# Patient Record
Sex: FEMALE | Race: BLACK OR AFRICAN AMERICAN | Employment: UNEMPLOYED | URBAN - METROPOLITAN AREA
[De-identification: names, ages, dates, MRNs, and addresses within clinical notes are randomized per-mention and may not be internally consistent; named-entity substitution may affect disease eponyms.]

---

## 2017-04-17 ENCOUNTER — HOSPITAL ENCOUNTER (EMERGENCY)
Age: 32
Discharge: HOME | End: 2017-04-17
Attending: EMERGENCY MEDICINE
Payer: MEDICAID

## 2017-04-17 VITALS
DIASTOLIC BLOOD PRESSURE: 85 MMHG | WEIGHT: 230 LBS | TEMPERATURE: 98.2 F | BODY MASS INDEX: 32.2 KG/M2 | HEIGHT: 71 IN | SYSTOLIC BLOOD PRESSURE: 120 MMHG | RESPIRATION RATE: 18 BRPM | OXYGEN SATURATION: 98 % | HEART RATE: 77 BPM

## 2017-04-17 DIAGNOSIS — N73.0 PID (ACUTE PELVIC INFLAMMATORY DISEASE): ICD-10-CM

## 2017-04-17 DIAGNOSIS — N76.0 BV (BACTERIAL VAGINOSIS): Primary | ICD-10-CM

## 2017-04-17 DIAGNOSIS — B96.89 BV (BACTERIAL VAGINOSIS): Primary | ICD-10-CM

## 2017-04-17 LAB
APPEARANCE UR: ABNORMAL
BACTERIA URNS QL MICRO: ABNORMAL /HPF
BILIRUB UR QL: NEGATIVE
CLUE CELLS VAG QL WET PREP: NORMAL
COLOR UR: ABNORMAL
EPITH CASTS URNS QL MICRO: ABNORMAL /LPF
GLUCOSE UR STRIP.AUTO-MCNC: NEGATIVE MG/DL
HCG UR QL: NEGATIVE
HGB UR QL STRIP: NEGATIVE
HYALINE CASTS URNS QL MICRO: ABNORMAL /LPF (ref 0–5)
KETONES UR QL STRIP.AUTO: NEGATIVE MG/DL
KOH PREP SPEC: NORMAL
LEUKOCYTE ESTERASE UR QL STRIP.AUTO: NEGATIVE
NITRITE UR QL STRIP.AUTO: NEGATIVE
PH UR STRIP: 6 [PH] (ref 5–8)
PROT UR STRIP-MCNC: NEGATIVE MG/DL
RBC #/AREA URNS HPF: ABNORMAL /HPF (ref 0–5)
SERVICE CMNT-IMP: NORMAL
SP GR UR REFRACTOMETRY: 1.02 (ref 1–1.03)
T VAGINALIS VAG QL WET PREP: NORMAL
UROBILINOGEN UR QL STRIP.AUTO: 0.2 EU/DL (ref 0.2–1)
WBC URNS QL MICRO: ABNORMAL /HPF (ref 0–4)

## 2017-04-17 PROCEDURE — 96372 THER/PROPH/DIAG INJ SC/IM: CPT

## 2017-04-17 PROCEDURE — 87210 SMEAR WET MOUNT SALINE/INK: CPT | Performed by: EMERGENCY MEDICINE

## 2017-04-17 PROCEDURE — 99284 EMERGENCY DEPT VISIT MOD MDM: CPT

## 2017-04-17 PROCEDURE — 81001 URINALYSIS AUTO W/SCOPE: CPT | Performed by: EMERGENCY MEDICINE

## 2017-04-17 PROCEDURE — 74011250636 HC RX REV CODE- 250/636: Performed by: EMERGENCY MEDICINE

## 2017-04-17 PROCEDURE — 87491 CHLMYD TRACH DNA AMP PROBE: CPT | Performed by: EMERGENCY MEDICINE

## 2017-04-17 PROCEDURE — 81025 URINE PREGNANCY TEST: CPT

## 2017-04-17 PROCEDURE — 74011000250 HC RX REV CODE- 250: Performed by: EMERGENCY MEDICINE

## 2017-04-17 RX ORDER — METRONIDAZOLE 500 MG/1
500 TABLET ORAL 2 TIMES DAILY
Qty: 14 TAB | Refills: 0 | Status: SHIPPED | OUTPATIENT
Start: 2017-04-17 | End: 2017-04-17

## 2017-04-17 RX ORDER — DOXYCYCLINE HYCLATE 100 MG
100 TABLET ORAL 2 TIMES DAILY
Qty: 20 TAB | Refills: 0 | Status: SHIPPED | OUTPATIENT
Start: 2017-04-17 | End: 2017-04-27

## 2017-04-17 RX ORDER — DOXYCYCLINE HYCLATE 100 MG
100 TABLET ORAL 2 TIMES DAILY
Qty: 20 TAB | Refills: 0 | Status: SHIPPED | OUTPATIENT
Start: 2017-04-17 | End: 2017-04-17

## 2017-04-17 RX ORDER — METRONIDAZOLE 500 MG/1
500 TABLET ORAL 2 TIMES DAILY
Qty: 14 TAB | Refills: 0 | Status: SHIPPED | OUTPATIENT
Start: 2017-04-17 | End: 2017-04-24

## 2017-04-17 RX ADMIN — LIDOCAINE HYDROCHLORIDE 250 MG: 10 INJECTION, SOLUTION EPIDURAL; INFILTRATION; INTRACAUDAL; PERINEURAL at 12:35

## 2017-04-17 NOTE — ED PROVIDER NOTES
HPI Comments: 28 y.o. female with past medical history significant for depression, gonorrhea and chlamydia who presents from home with chief complaint of abdominal pain. Patient arrives today complaining of 3-4 days of intermittent mid-lower abdominal pain. She reports dysuria and white vaginal discharge. She denies nausea, vomiting, diarrhea, hematuria, urinary frequency or urinary urgency. Patient reports having similar symptoms in the past when she had a yeast infection during pregnancy. She is currently on birth control pills. She states that her LMP was 1 week ago (ended 4-5 days ago) and was normal. There are no other acute medical concerns at this time. Social hx: Nonsmoker, occasional alcohol use    PCP: Manuel Chapin MD    Note written by adria Diane, as dictated by Rosangela Lyons MD 10:07 AM      The history is provided by the patient. Past Medical History:   Diagnosis Date    Chlamydia 2008    Dental caries 8/10/2012    Depression     Gonorrhea 2008    Lipoma of back        History reviewed. No pertinent surgical history. Family History:   Problem Relation Age of Onset    Cancer Mother     Heart Disease Father        Social History     Social History    Marital status: SINGLE     Spouse name: N/A    Number of children: N/A    Years of education: N/A     Occupational History    Not on file. Social History Main Topics    Smoking status: Never Smoker    Smokeless tobacco: Never Used    Alcohol use No      Comment: occasional    Drug use: No    Sexual activity: Yes     Partners: Male     Birth control/ protection: None     Other Topics Concern    Not on file     Social History Narrative         ALLERGIES: Review of patient's allergies indicates no known allergies. Review of Systems   Constitutional: Negative for appetite change, chills and fever. HENT: Negative for rhinorrhea, sore throat and trouble swallowing. Eyes: Negative for photophobia.    Respiratory: Negative for cough and shortness of breath. Cardiovascular: Negative for chest pain and palpitations. Gastrointestinal: Positive for abdominal pain. Negative for diarrhea, nausea and vomiting. Genitourinary: Positive for dysuria and vaginal discharge. Negative for frequency, hematuria and urgency. Musculoskeletal: Negative for arthralgias. Neurological: Negative for dizziness, syncope and weakness. Psychiatric/Behavioral: Negative for behavioral problems. The patient is not nervous/anxious. All other systems reviewed and are negative. Vitals:    04/17/17 0920   BP: 143/82   Pulse: 68   Resp: 16   Temp: 98 °F (36.7 °C)   SpO2: 98%   Weight: 104.3 kg (230 lb)   Height: 5' 11\" (1.803 m)            Physical Exam   Constitutional: She appears well-developed and well-nourished. HENT:   Head: Normocephalic and atraumatic. Mouth/Throat: Oropharynx is clear and moist.   Eyes: EOM are normal. Pupils are equal, round, and reactive to light. Neck: Normal range of motion. Neck supple. Cardiovascular: Normal rate, regular rhythm, normal heart sounds and intact distal pulses. Exam reveals no gallop and no friction rub. No murmur heard. Pulmonary/Chest: Effort normal. No respiratory distress. She has no wheezes. She has no rales. Abdominal: Soft. There is no tenderness. There is no rebound. Genitourinary:   Genitourinary Comments: Vulva/vagina scant white discharge  Cervix; tender  Adnexa;  tender Uterus  tender     Musculoskeletal: Normal range of motion. She exhibits no tenderness. Neurological: She is alert. No cranial nerve deficit. Motor; symmetric   Skin: No erythema. Psychiatric: She has a normal mood and affect. Her behavior is normal.   Nursing note and vitals reviewed.   Note written by adria Grullon, as dictated by Livier Avila MD 10:05 AM      [facility]  ED Course       Procedures

## 2017-04-17 NOTE — DISCHARGE INSTRUCTIONS
Bacterial Vaginosis: Care Instructions  Your Care Instructions    Bacterial vaginosis is a type of vaginal infection. It is caused by excess growth of certain bacteria that are normally found in the vagina. Symptoms can include itching, swelling, pain when you urinate or have sex, and a gray or yellow discharge with a \"fishy\" odor. It is not considered an infection that is spread through sexual contact. Although symptoms can be annoying and uncomfortable, bacterial vaginosis does not usually cause other health problems. However, if you have it while you are pregnant, it can cause complications. While the infection may go away on its own, most doctors use antibiotics to treat it. You may have been prescribed pills or vaginal cream. With treatment, bacterial vaginosis usually clears up in 5 to 7 days. Follow-up care is a key part of your treatment and safety. Be sure to make and go to all appointments, and call your doctor if you are having problems. It's also a good idea to know your test results and keep a list of the medicines you take. How can you care for yourself at home? · Take your antibiotics as directed. Do not stop taking them just because you feel better. You need to take the full course of antibiotics. · Do not eat or drink anything that contains alcohol if you are taking metronidazole (Flagyl). · Keep using your medicine if you start your period. Use pads instead of tampons while using a vaginal cream or suppository. Tampons can absorb the medicine. · Wear loose cotton clothing. Do not wear nylon and other materials that hold body heat and moisture close to the skin. · Do not scratch. Relieve itching with a cold pack or a cool bath. · Do not wash your vaginal area more than once a day. Use plain water or a mild, unscented soap. Do not douche. When should you call for help?   Watch closely for changes in your health, and be sure to contact your doctor if:  · You have unexpected vaginal bleeding. · You have a fever. · You have new or increased pain in your vagina or pelvis. · You are not getting better after 1 week. · Your symptoms return after you finish the course of your medicine. Where can you learn more? Go to http://adrián-david.info/. Roosevelt Hernandez in the search box to learn more about \"Bacterial Vaginosis: Care Instructions. \"  Current as of: October 13, 2016  Content Version: 11.2  © 8746-7781 [employer]. Care instructions adapted under license by [employer] (which disclaims liability or warranty for this information). If you have questions about a medical condition or this instruction, always ask your healthcare professional. [address] any warranty or liability for your use of this information. We hope that we have addressed all of your medical concerns. The examination and treatment you received in the Emergency Department were for an emergent problem and were not intended as complete care. It is important that you follow up with your healthcare provider(s) for ongoing care. If your symptoms worsen or do not improve as expected, and you are unable to reach your usual health care provider(s), you should return to the Emergency Department. Today's healthcare is undergoing tremendous change, and patient satisfaction surveys are one of the many tools to assess the quality of medical care. You may receive a survey from the [employer] regarding your experience in the Emergency Department. I hope that your experience has been completely positive, particularly the medical care that I provided. As such, please participate in the survey; anything less than excellent does not meet my expectations or intentions. [facility] and [facility] participate in nationally recognized quality of care measures.   If your blood pressure is greater than 120/80, as reported below, we urge that you seek medical care to address the potential of high blood pressure, commonly known as hypertension. Hypertension can be hereditary or can be caused by certain medical conditions, pain, stress, or \"white coat syndrome. \"       Please make an appointment with your health care provider(s) for follow up of your Emergency Department visit. VITALS:   Patient Vitals for the past 8 hrs:   Temp Pulse Resp BP SpO2   04/17/17 1226 98.1 °F (36.7 °C) 77 18 126/86 98 %   04/17/17 0920 98 °F (36.7 °C) 68 16 143/82 98 %          Thank you for allowing us to provide you with medical care today. We realize that you have many choices for your emergency care needs. Please choose us in the future for any continued health care needs.       Analy Meade MD    [address] [place] [address].   Office: [phone]            Recent Results (from the past 24 hour(s))   HCG URINE, QL. - POC    Collection Time: 04/17/17  9:24 AM   Result Value Ref Range    Pregnancy test,urine (POC) NEGATIVE  NEG     URINALYSIS W/MICROSCOPIC    Collection Time: 04/17/17  9:26 AM   Result Value Ref Range    Color YELLOW/STRAW      Appearance CLOUDY (A) CLEAR      Specific gravity 1.020 1.003 - 1.030      pH (UA) 6.0 5.0 - 8.0      Protein NEGATIVE  NEG mg/dL    Glucose NEGATIVE  NEG mg/dL    Ketone NEGATIVE  NEG mg/dL    Bilirubin NEGATIVE  NEG      Blood NEGATIVE  NEG      Urobilinogen 0.2 0.2 - 1.0 EU/dL    Nitrites NEGATIVE  NEG      Leukocyte Esterase NEGATIVE  NEG      WBC 0-4 0 - 4 /hpf    RBC 0-5 0 - 5 /hpf    Epithelial cells FEW FEW /lpf    Bacteria 1+ (A) NEG /hpf    Hyaline cast 0-2 0 - 5 /lpf   WET PREP    Collection Time: 04/17/17 10:07 AM   Result Value Ref Range    Clue cells CLUE CELLS PRESENT      Wet prep NO TRICHOMONAS SEEN     KOH, OTHER SOURCES    Collection Time: 04/17/17 10:07 AM   Result Value Ref Range    Special Requests: NO SPECIAL REQUESTS      KOH NO YEAST SEEN No results found.

## 2017-04-17 NOTE — ED NOTES
Patient has received discharge instructions from ER MD, verbalizes understanding.   Ambulatory upon discharge

## 2017-04-17 NOTE — ED TRIAGE NOTES
Pt. complains of lower abd pain for \"a few days\". Does admit to pain with urination. Also complains of having a headache.

## 2017-04-17 NOTE — LETTER
[address] 
[address] [facility] [address] [id number] 
[phone] Work/School Note Date: 4/17/2017 To Whom It May concern: 
 
Wing Joy was seen and treated today in the emergency room by the following provider(s): 
Attending Provider: Mariam Barlow MD. Wing Joy may return to work on Tuesday, April 18, 2017. Sincerely, Fátima Bar RN

## 2017-04-19 LAB
C TRACH DNA SPEC QL NAA+PROBE: NEGATIVE
N GONORRHOEA DNA SPEC QL NAA+PROBE: NEGATIVE
SAMPLE TYPE: NORMAL
SERVICE CMNT-IMP: NORMAL
SPECIMEN SOURCE: NORMAL

## 2017-06-11 ENCOUNTER — HOSPITAL ENCOUNTER (EMERGENCY)
Age: 32
Discharge: HOME | End: 2017-06-11
Attending: FAMILY MEDICINE

## 2017-06-11 ENCOUNTER — APPOINTMENT (OUTPATIENT)
Dept: GENERAL RADIOLOGY | Age: 32
End: 2017-06-11
Attending: FAMILY MEDICINE

## 2017-06-11 VITALS
BODY MASS INDEX: 33.32 KG/M2 | DIASTOLIC BLOOD PRESSURE: 68 MMHG | HEART RATE: 85 BPM | SYSTOLIC BLOOD PRESSURE: 134 MMHG | RESPIRATION RATE: 20 BRPM | OXYGEN SATURATION: 97 % | TEMPERATURE: 98.1 F | HEIGHT: 71 IN | WEIGHT: 238 LBS

## 2017-06-11 DIAGNOSIS — S93.402A SPRAIN OF LEFT ANKLE, UNSPECIFIED LIGAMENT, INITIAL ENCOUNTER: Primary | ICD-10-CM

## 2017-06-11 RX ORDER — ACETAMINOPHEN AND CODEINE PHOSPHATE 300; 30 MG/1; MG/1
1 TABLET ORAL
Qty: 20 TAB | Refills: 0 | Status: SHIPPED | OUTPATIENT
Start: 2017-06-11 | End: 2017-07-25

## 2017-06-11 RX ORDER — IBUPROFEN 800 MG/1
800 TABLET ORAL
Qty: 20 TAB | Refills: 0 | Status: SHIPPED | OUTPATIENT
Start: 2017-06-11 | End: 2017-06-18

## 2017-06-11 NOTE — DISCHARGE INSTRUCTIONS
Ankle Sprain: Care Instructions  Your Care Instructions    An ankle sprain can happen when you twist your ankle. The ligaments that support the ankle can get stretched and torn. Often the ankle is swollen and painful. Ankle sprains may take from several weeks to several months to heal. Usually, the more pain and swelling you have, the more severe your ankle sprain is and the longer it will take to heal. You can heal faster and regain strength in your ankle with good home treatment. It is very important to give your ankle time to heal completely, so that you do not easily hurt your ankle again. Follow-up care is a key part of your treatment and safety. Be sure to make and go to all appointments, and call your doctor if you are having problems. It's also a good idea to know your test results and keep a list of the medicines you take. How can you care for yourself at home? · Prop up your foot on pillows as much as possible for the next 3 days. Try to keep your ankle above the level of your heart. This will help reduce the swelling. · Follow your doctor's directions for wearing a splint or elastic bandage. Wrapping the ankle may help reduce or prevent swelling. · Your doctor may give you a splint, a brace, an air stirrup, or another form of ankle support to protect your ankle until it is healed. Wear it as directed while your ankle is healing. Do not remove it unless your doctor tells you to. After your ankle has healed, ask your doctor whether you should wear the brace when you exercise. · Put ice or cold packs on your injured ankle for 10 to 20 minutes at a time. Try to do this every 1 to 2 hours for the next 3 days (when you are awake) or until the swelling goes down. Put a thin cloth between the ice and your skin. · You may need to use crutches until you can walk without pain. If you do use crutches, try to bear some weight on your injured ankle if you can do so without pain.  This helps the ankle heal.  · Take pain medicines exactly as directed. ¨ If the doctor gave you a prescription medicine for pain, take it as prescribed. ¨ If you are not taking a prescription pain medicine, ask your doctor if you can take an over-the-counter medicine. · If you have been given ankle exercises to do at home, do them exactly as instructed. These can promote healing and help prevent lasting weakness. When should you call for help? Call your doctor now or seek immediate medical care if:  · Your pain is getting worse. · Your swelling is getting worse. · Your splint feels too tight or you are unable to loosen it. Watch closely for changes in your health, and be sure to contact your doctor if:  · You are not getting better after 1 week. Where can you learn more? Go to http://adrián-david.info/. Enter A060 in the search box to learn more about \"Ankle Sprain: Care Instructions. \"  Current as of: May 23, 2016  Content Version: 11.2  © 7825-9811 [employer]. Care instructions adapted under license by [employer] (which disclaims liability or warranty for this information). If you have questions about a medical condition or this instruction, always ask your healthcare professional. [place] [address] any warranty or liability for your use of this information.

## 2017-06-11 NOTE — UC PROVIDER NOTE
Patient is a 28 y.o. female presenting with fall. The history is provided by the patient. Fall   The accident occurred yesterday. The fall occurred while walking. She fell from a height of ground level. Pain location: Left ankle. The pain is moderate. She was ambulatory at the scene. Pertinent negatives include no visual change, no numbness, no abdominal pain, no extremity weakness, no loss of consciousness, no tingling and no laceration. The symptoms are aggravated by ambulation and use of injured limb. She has tried nothing for the symptoms. It is unknown when the patient last had a tetanus shot. Past Medical History:   Diagnosis Date    Chlamydia 2008    Dental caries 8/10/2012    Depression     Gonorrhea 2008    Lipoma of back         History reviewed. No pertinent surgical history. Family History   Problem Relation Age of Onset    Cancer Mother     Heart Disease Father         Social History     Social History    Marital status: SINGLE     Spouse name: N/A    Number of children: N/A    Years of education: N/A     Occupational History    Not on file. Social History Main Topics    Smoking status: Never Smoker    Smokeless tobacco: Never Used    Alcohol use No      Comment: occasional    Drug use: No    Sexual activity: Yes     Partners: Male     Birth control/ protection: None     Other Topics Concern    Not on file     Social History Narrative                ALLERGIES: Review of patient's allergies indicates no known allergies. Review of Systems   Constitutional: Negative for activity change. Gastrointestinal: Negative for abdominal pain. Musculoskeletal: Positive for arthralgias and joint swelling. Negative for extremity weakness. Neurological: Negative for tingling, loss of consciousness and numbness.        Vitals:    06/11/17 0835   BP: 134/68   Pulse: 85   Resp: 20   Temp: 98.1 °F (36.7 °C)   SpO2: 97%   Weight: 108 kg (238 lb)   Height: 5' 11\" (1.803 m) Physical Exam   Constitutional: She is oriented to person, place, and time. She appears well-developed and well-nourished. Eyes: Conjunctivae and EOM are normal.   Pulmonary/Chest: Effort normal.   Musculoskeletal: She exhibits tenderness. Left lateral ankle swollen, tender   Neurological: She is alert and oriented to person, place, and time. Skin: Skin is warm and dry. No laceration noted. Psychiatric: She has a normal mood and affect. Her behavior is normal. Judgment and thought content normal.   Nursing note and vitals reviewed. MDM     Differential Diagnosis; Clinical Impression; Plan:     CLINICAL IMPRESSION:  Sprain of left ankle, unspecified ligament, initial encounter  (primary encounter diagnosis)    Plan:  1. Motrin   2. Tylenol 3  3. Crutches for ambulation. Ice and elevation  Amount and/or Complexity of Data Reviewed:   Tests in the radiology section of CPT®:  Ordered and reviewed  Risk of Significant Complications, Morbidity, and/or Mortality:   Presenting problems: Moderate  Diagnostic procedures: Moderate  Management options:   Moderate  Progress:   Patient progress:  Stable      Procedures

## 2017-07-25 ENCOUNTER — HOSPITAL ENCOUNTER (EMERGENCY)
Age: 32
Discharge: HOME | End: 2017-07-25
Attending: FAMILY MEDICINE

## 2017-07-25 VITALS
RESPIRATION RATE: 16 BRPM | WEIGHT: 242 LBS | TEMPERATURE: 97.7 F | SYSTOLIC BLOOD PRESSURE: 147 MMHG | HEIGHT: 71 IN | HEART RATE: 78 BPM | DIASTOLIC BLOOD PRESSURE: 84 MMHG | BODY MASS INDEX: 33.88 KG/M2 | OXYGEN SATURATION: 100 %

## 2017-07-25 DIAGNOSIS — M26.621 ARTHRALGIA OF RIGHT TEMPOROMANDIBULAR JOINT: Primary | ICD-10-CM

## 2017-07-25 RX ORDER — DICLOFENAC POTASSIUM 50 MG/1
50 TABLET, FILM COATED ORAL 3 TIMES DAILY
Qty: 50 TAB | Refills: 0 | Status: SHIPPED | OUTPATIENT
Start: 2017-07-25 | End: 2018-03-26

## 2017-07-25 RX ORDER — METHOCARBAMOL 500 MG/1
500 TABLET, FILM COATED ORAL 3 TIMES DAILY
Qty: 20 TAB | Refills: 0 | Status: SHIPPED | OUTPATIENT
Start: 2017-07-25 | End: 2018-03-26

## 2017-07-25 NOTE — UC PROVIDER NOTE
Patient is a 28 y.o. female presenting with dental problem. The history is provided by the patient. No  was used. Dental Pain    This is a new problem. Episode onset: 6/28/17 had a root canal performed. Now with right jaw pain since procedure. No hx of teeth grinding. Episode frequency: When ever she opens her mouth she has right jaw pain. The problem has not changed since onset. The pain is located in the right lower mouth. The quality of the pain is sharp and intermittent. The pain is at a severity of 7/10. The pain is moderate. There was no vomiting, no nausea, no headaches and no gum redness. Treatments tried: NAIDS. The treatment provided no relief. The patient has no cardiac history. Past Medical History:   Diagnosis Date    Chlamydia 2008    Dental caries 8/10/2012    Depression     Gonorrhea 2008    Lipoma of back         No past surgical history on file. Family History   Problem Relation Age of Onset    Cancer Mother     Heart Disease Father         Social History     Social History    Marital status: SINGLE     Spouse name: N/A    Number of children: N/A    Years of education: N/A     Occupational History    Not on file. Social History Main Topics    Smoking status: Never Smoker    Smokeless tobacco: Never Used    Alcohol use No      Comment: occasional    Drug use: No    Sexual activity: Yes     Partners: Male     Birth control/ protection: None     Other Topics Concern    Not on file     Social History Narrative                ALLERGIES: Review of patient's allergies indicates no known allergies. Review of Systems   Constitutional: Negative. HENT: Positive for dental problem. Right jaw pain. Denies injury or bruxism. Eyes: Negative. Respiratory: Negative. Cardiovascular: Negative. Musculoskeletal: Negative. Skin: Negative. Allergic/Immunologic: Negative. Neurological: Negative.         Vitals:    07/25/17 1638   BP: 147/84   Pulse: 78   Resp: 16   Temp: 97.7 °F (36.5 °C)   SpO2: 100%   Weight: 109.8 kg (242 lb)   Height: 5' 11\" (1.803 m)       Physical Exam   Constitutional: She is oriented to person, place, and time. She appears well-developed and well-nourished. HENT:   Head: Normocephalic and atraumatic. Right Ear: External ear normal.   Left Ear: External ear normal.   Nose: Nose normal.   Mouth/Throat: Oropharynx is clear and moist. No oropharyngeal exudate. Right TMJ without crepitus or clicking with ROM  Reports pain with opening mouth  No dental caries     Eyes: Conjunctivae and EOM are normal. Pupils are equal, round, and reactive to light. No scleral icterus. Neck: Normal range of motion. Cardiovascular: Normal rate, regular rhythm and normal heart sounds. Pulmonary/Chest: Effort normal and breath sounds normal.   Musculoskeletal: Normal range of motion. Lymphadenopathy:     She has no cervical adenopathy. Neurological: She is alert and oriented to person, place, and time. Skin: Skin is warm and dry. Psychiatric: She has a normal mood and affect. Her behavior is normal. Judgment and thought content normal.   Nursing note and vitals reviewed. MDM     Differential Diagnosis; Clinical Impression; Plan:     CLINICAL IMPRESSION:  Arthralgia of right temporomandibular joint  (primary encounter diagnosis)    Plan:  1. Arthralgia of right temporomandibular joint  2. Try Diclofenac/Robaxin  3. If no improvement you need to see your dentist  Risk of Significant Complications, Morbidity, and/or Mortality:   Presenting problems: Moderate  Diagnostic procedures:   Moderate  Progress:   Patient progress:  Stable      Procedures

## 2017-07-25 NOTE — DISCHARGE INSTRUCTIONS
Temporomandibular Disorder: Care Instructions  Your Care Instructions    Temporomandibular (TM) disorders are a problem with the muscles and joints that connect your jaw to your skull. They cause pain when you open your mouth, chew, or yawn. You may feel this pain on one or both sides. TM disorders are often caused by tight jaw muscles. The tightness can be caused by clenching or grinding your teeth. This may happen when you have a lot of stress in your life. If you lower your stress, you may be able to stop clenching or grinding your teeth. This will help relax your jaw and reduce your pain. You may also be able to do some things at home to feel better. But if none of this works, your doctor may prescribe medicine to help relax your muscles and control the pain. Follow-up care is a key part of your treatment and safety. Be sure to make and go to all appointments, and call your doctor if you are having problems. It's also a good idea to know your test results and keep a list of the medicines you take. How can you care for yourself at home? · Put a warm, moist cloth or heating pad set on low on your jaw. Do this for 10 to 20 minutes at a time. Put a thin cloth between the heating pad and your skin. · Avoid hard or chewy foods that cause your jaws to work very hard. Examples include popcorn, jerky, tough meats, chewy breads, gum, and raw apples and carrots. · Choose softer foods that are easy to chew. These include eggs, yogurt, and soup. · Cut your food into small pieces. Chew slowly. · If your jaw gets too painful to chew, or if it locks, you may need to puree your food for a few days or weeks. · To relax your jaw, repeat this exercise for a few minutes every morning and evening. Watch yourself in a mirror. Gently open and close your mouth. Move your jaw straight up and down. But don't do this if it makes your pain worse.   · Get at least 30 minutes of exercise on most days of the week to relieve stress. Walking is a good choice. You also may want to do other activities, such as running, swimming, cycling, or playing tennis or team sports. · Do not:  ¨ Hold a phone between your shoulder and your jaw. ¨ Open your mouth all the way, like when you sing loudly or yawn. ¨ Clench or grind your teeth, bite your lips, or chew your fingernails. ¨ Clench things such as pens, pipes, or cigars between your teeth. When should you call for help? Call your doctor now or seek immediate medical care if:  · Your jaw is locked open or shut or it is hard to move your jaw. Watch closely for changes in your health, and be sure to contact your doctor if:  · Your jaw pain gets worse. · Your face is swollen. · You do not get better as expected. Where can you learn more? Go to http://adrián-david.info/. Enter P402 in the search box to learn more about \"Temporomandibular Disorder: Care Instructions. \"  Current as of: August 9, 2016  Content Version: 11.3  © 6060-8578 for; [employer]. Care instructions adapted under license by [employer] (which disclaims liability or warranty for this information). If you have questions about a medical condition or this instruction, always ask your healthcare professional. [address] any warranty or liability for your use of this information.

## 2017-10-28 ENCOUNTER — HOSPITAL ENCOUNTER (EMERGENCY)
Age: 32
Discharge: HOME | End: 2017-10-28
Attending: STUDENT IN AN ORGANIZED HEALTH CARE EDUCATION/TRAINING PROGRAM
Payer: SELF-PAY

## 2017-10-28 VITALS
TEMPERATURE: 98 F | RESPIRATION RATE: 18 BRPM | WEIGHT: 230 LBS | OXYGEN SATURATION: 96 % | HEART RATE: 89 BPM | HEIGHT: 71 IN | SYSTOLIC BLOOD PRESSURE: 139 MMHG | BODY MASS INDEX: 32.2 KG/M2 | DIASTOLIC BLOOD PRESSURE: 89 MMHG

## 2017-10-28 DIAGNOSIS — L42 PITYRIASIS ROSEA: Primary | ICD-10-CM

## 2017-10-28 PROCEDURE — 99282 EMERGENCY DEPT VISIT SF MDM: CPT

## 2017-10-28 NOTE — DISCHARGE INSTRUCTIONS
Pityriasis Rosea: Care Instructions  Your Care Instructions    Pityriasis rosea (say \"pit-uh-RY-uh-johana ADDISON-zee-uh\") is a common skin rash. It usually starts as one scaly, reddish-pink spot on your stomach or back. Days or weeks later, more spots appear. The rash may itch, but it will not spread to other people. No one knows what causes pityriasis rosea. Some doctors believe it is a reaction to a virus. Pityriasis rosea is most common in children and young adults. It lasts 1 to 3 months and then goes away on its own. Medicine can help relieve any itching. Follow-up care is a key part of your treatment and safety. Be sure to make and go to all appointments, and call your doctor if you are having problems. It's also a good idea to know your test results and keep a list of the medicines you take. How can you care for yourself at home? · Use your medicine exactly as prescribed. Call your doctor if you have any problems with your medicine. · Expose your skin to small amounts of sunlight, but avoid sunburn. Sunlight can lessen the rash. · Use a mild soap, such as Dove or Cetaphil, when you wash your skin. · Add a handful of oatmeal (ground to a powder) to your bath. Or you can try an oatmeal bath product, such as Aveeno. Keep the water warm or lukewarm. A hot bath or shower may make the rash more visible and itchy. · Use an over-the-counter 1% hydrocortisone cream for small itchy areas. When should you call for help? Call your doctor now or seek immediate medical care if:  ? · You have signs of infection such as:  ¨ Pain, warmth, or swelling near the rash. ¨ Red streaks near the rash. ¨ Pus coming from the rash. ¨ A fever. ? Watch closely for changes in your health, and be sure to contact your doctor if:  ? · You see the rash on the palms of your hands or the soles of your feet. ? · You do not get better as expected. Where can you learn more?   Go to http://adrián-david.info/. Enter S327 in the search box to learn more about \"Pityriasis Rosea: Care Instructions. \"  Current as of: October 13, 2016  Content Version: 11.4  © 3293-3296 [employer]. Care instructions adapted under license by [employer] (which disclaims liability or warranty for this information). If you have questions about a medical condition or this instruction, always ask your healthcare professional. [address] any warranty or liability for your use of this information.

## 2017-10-28 NOTE — ED PROVIDER NOTES
HPI Comments: 33year old female presenting to the ED for rash. Pt reports \"red circles\" to her face, torso, right arm, and sides. Pt denies pain, notes that it itches after showering. Pt notes that rash has been present for a few days, started with a single patch to the right flank. Denies new lotions, soaps. Changed to a new detergent 2 weeks ago. No fever or other recent symptoms. Pt tried hydrocortisone cream with no significant relief. No one else at home with similar rash. No fever, chills, cough, congestion, or other recent concerns. PMHx: depression, gonorrhea, chlamydia  Social: non-smoker. Works at [employer]. Patient is a 33 y.o. female presenting with rash. The history is provided by the patient. Rash           Past Medical History:   Diagnosis Date    Chlamydia 2008    Dental caries 8/10/2012    Depression     Gonorrhea 2008    Lipoma of back        History reviewed. No pertinent surgical history. Family History:   Problem Relation Age of Onset    Cancer Mother     Heart Disease Father        Social History     Social History    Marital status: SINGLE     Spouse name: N/A    Number of children: N/A    Years of education: N/A     Occupational History    Not on file. Social History Main Topics    Smoking status: Never Smoker    Smokeless tobacco: Never Used    Alcohol use No      Comment: occasional    Drug use: No    Sexual activity: Yes     Partners: Male     Birth control/ protection: None     Other Topics Concern    Not on file     Social History Narrative         ALLERGIES: Review of patient's allergies indicates no known allergies. Review of Systems   Constitutional: Negative for fever. HENT: Negative for congestion. Eyes: Negative for discharge and redness. Respiratory: Negative for cough and shortness of breath. Cardiovascular: Negative for chest pain. Gastrointestinal: Negative for diarrhea and vomiting.    Genitourinary: Negative for difficulty urinating. Skin: Positive for rash. Negative for wound. Neurological: Negative for syncope. All other systems reviewed and are negative. Vitals:    10/28/17 1804   BP: 139/89   Pulse: 89   Resp: 18   Temp: 98 °F (36.7 °C)   SpO2: 96%   Weight: 104.3 kg (230 lb)   Height: 5' 11\" (1.803 m)            Physical Exam   Constitutional: She is oriented to person, place, and time. She appears well-developed and well-nourished. No distress. Pleasant AA female   HENT:   Head: Normocephalic and atraumatic. Right Ear: External ear normal.   Left Ear: External ear normal.   Eyes: Conjunctivae are normal. No scleral icterus. Neck: Neck supple. No tracheal deviation present. Cardiovascular: Normal rate, regular rhythm and normal heart sounds. Exam reveals no gallop and no friction rub. No murmur heard. Pulmonary/Chest: Effort normal and breath sounds normal. No stridor. No respiratory distress. She has no wheezes. Abdominal: Soft. She exhibits no distension. Musculoskeletal: Normal range of motion. Neurological: She is alert and oriented to person, place, and time. Skin: Skin is warm and dry. Multiple oval patches to the trunk following skin tension lines with some excoriation, no significant scale, no purulence  Largest patch to the right flank     Psychiatric: She has a normal mood and affect. Her behavior is normal.   Nursing note and vitals reviewed. MDM  Number of Diagnoses or Management Options  Pityriasis rosea:   Diagnosis management comments: 33year old female presenting to the ED for pruritic rash to the trunk, somewhat to the arm and face. Started with single patch to the flank. Exam c/w pityriasis. No scale c/f fungal infection, no purulence c/f bacterial infection. Discussed supportive care, return precautions.        Amount and/or Complexity of Data Reviewed  Discuss the patient with other providers: yes (Dr. Johana Roman, ED attending)      ED Course Procedures

## 2017-10-28 NOTE — ED TRIAGE NOTES
Pt presents with complaints of circular rash that is worsening over the past two days. Pt states that she has spots on her abdomen, right breast and back that presented after she was wearing a girdle.

## 2018-03-26 ENCOUNTER — APPOINTMENT (OUTPATIENT)
Dept: GENERAL RADIOLOGY | Age: 33
End: 2018-03-26
Attending: PHYSICIAN ASSISTANT
Payer: SELF-PAY

## 2018-03-26 ENCOUNTER — HOSPITAL ENCOUNTER (EMERGENCY)
Age: 33
Discharge: HOME | End: 2018-03-26
Attending: EMERGENCY MEDICINE
Payer: SELF-PAY

## 2018-03-26 VITALS
RESPIRATION RATE: 16 BRPM | TEMPERATURE: 98.8 F | SYSTOLIC BLOOD PRESSURE: 129 MMHG | DIASTOLIC BLOOD PRESSURE: 84 MMHG | BODY MASS INDEX: 33.6 KG/M2 | OXYGEN SATURATION: 99 % | WEIGHT: 240 LBS | HEART RATE: 86 BPM | HEIGHT: 71 IN

## 2018-03-26 DIAGNOSIS — R07.9 ACUTE CHEST PAIN: Primary | ICD-10-CM

## 2018-03-26 LAB
ALBUMIN SERPL-MCNC: 3.5 G/DL (ref 3.5–5)
ALBUMIN/GLOB SERPL: 0.9 {RATIO} (ref 1.1–2.2)
ALP SERPL-CCNC: 140 U/L (ref 45–117)
ALT SERPL-CCNC: 19 U/L (ref 12–78)
ANION GAP SERPL CALC-SCNC: 5 MMOL/L (ref 5–15)
AST SERPL-CCNC: 13 U/L (ref 15–37)
BASOPHILS # BLD: 0 K/UL (ref 0–0.1)
BASOPHILS NFR BLD: 0 % (ref 0–1)
BILIRUB SERPL-MCNC: 0.4 MG/DL (ref 0.2–1)
BUN SERPL-MCNC: 8 MG/DL (ref 6–20)
BUN/CREAT SERPL: 11 (ref 12–20)
CALCIUM SERPL-MCNC: 8.6 MG/DL (ref 8.5–10.1)
CHLORIDE SERPL-SCNC: 103 MMOL/L (ref 97–108)
CO2 SERPL-SCNC: 29 MMOL/L (ref 21–32)
CREAT SERPL-MCNC: 0.73 MG/DL (ref 0.55–1.02)
DIFFERENTIAL METHOD BLD: ABNORMAL
EOSINOPHIL # BLD: 0.1 K/UL (ref 0–0.4)
EOSINOPHIL NFR BLD: 1 % (ref 0–7)
ERYTHROCYTE [DISTWIDTH] IN BLOOD BY AUTOMATED COUNT: 12.6 % (ref 11.5–14.5)
GLOBULIN SER CALC-MCNC: 4.1 G/DL (ref 2–4)
GLUCOSE SERPL-MCNC: 96 MG/DL (ref 65–100)
HCG UR QL: NEGATIVE
HCT VFR BLD AUTO: 38.4 % (ref 35–47)
HGB BLD-MCNC: 12.7 G/DL (ref 11.5–16)
IMM GRANULOCYTES # BLD: 0 K/UL
IMM GRANULOCYTES NFR BLD AUTO: 0 %
LYMPHOCYTES # BLD: 4.6 K/UL (ref 0.8–3.5)
LYMPHOCYTES NFR BLD: 46 % (ref 12–49)
MCH RBC QN AUTO: 29.3 PG (ref 26–34)
MCHC RBC AUTO-ENTMCNC: 33.1 G/DL (ref 30–36.5)
MCV RBC AUTO: 88.5 FL (ref 80–99)
MONOCYTES # BLD: 0.5 K/UL (ref 0–1)
MONOCYTES NFR BLD: 5 % (ref 5–13)
NEUTS BAND NFR BLD MANUAL: 1 % (ref 0–6)
NEUTS SEG # BLD: 4.9 K/UL (ref 1.8–8)
NEUTS SEG NFR BLD: 47 % (ref 32–75)
NRBC # BLD: 0 K/UL (ref 0–0.01)
NRBC BLD-RTO: 0 PER 100 WBC
PLATELET # BLD AUTO: 320 K/UL (ref 150–400)
PMV BLD AUTO: 9 FL (ref 8.9–12.9)
POTASSIUM SERPL-SCNC: 3.5 MMOL/L (ref 3.5–5.1)
PROT SERPL-MCNC: 7.6 G/DL (ref 6.4–8.2)
RBC # BLD AUTO: 4.34 M/UL (ref 3.8–5.2)
RBC MORPH BLD: ABNORMAL
SODIUM SERPL-SCNC: 137 MMOL/L (ref 136–145)
TROPONIN I SERPL-MCNC: <0.04 NG/ML
WBC # BLD AUTO: 10.1 K/UL (ref 3.6–11)
WBC MORPH BLD: ABNORMAL

## 2018-03-26 PROCEDURE — 36415 COLL VENOUS BLD VENIPUNCTURE: CPT | Performed by: PHYSICIAN ASSISTANT

## 2018-03-26 PROCEDURE — 80053 COMPREHEN METABOLIC PANEL: CPT | Performed by: PHYSICIAN ASSISTANT

## 2018-03-26 PROCEDURE — 71046 X-RAY EXAM CHEST 2 VIEWS: CPT

## 2018-03-26 PROCEDURE — 81025 URINE PREGNANCY TEST: CPT

## 2018-03-26 PROCEDURE — 84484 ASSAY OF TROPONIN QUANT: CPT | Performed by: PHYSICIAN ASSISTANT

## 2018-03-26 PROCEDURE — 99284 EMERGENCY DEPT VISIT MOD MDM: CPT

## 2018-03-26 PROCEDURE — 85025 COMPLETE CBC W/AUTO DIFF WBC: CPT | Performed by: PHYSICIAN ASSISTANT

## 2018-03-26 PROCEDURE — 93005 ELECTROCARDIOGRAM TRACING: CPT

## 2018-03-26 PROCEDURE — 74011250637 HC RX REV CODE- 250/637: Performed by: EMERGENCY MEDICINE

## 2018-03-26 RX ORDER — METHOCARBAMOL 750 MG/1
750 TABLET, FILM COATED ORAL
Qty: 20 TAB | Refills: 0 | Status: SHIPPED | OUTPATIENT
Start: 2018-03-26 | End: 2018-03-31

## 2018-03-26 RX ORDER — IBUPROFEN 600 MG/1
600 TABLET ORAL
Qty: 20 TAB | Refills: 0 | Status: SHIPPED | OUTPATIENT
Start: 2018-03-26 | End: 2018-11-12

## 2018-03-26 RX ORDER — IBUPROFEN 600 MG/1
600 TABLET ORAL
Status: COMPLETED | OUTPATIENT
Start: 2018-03-26 | End: 2018-03-26

## 2018-03-26 RX ADMIN — IBUPROFEN 600 MG: 600 TABLET ORAL at 22:32

## 2018-03-26 NOTE — ED NOTES
7:44 PM  I have evaluated the patient as the Provider in Triage. I have reviewed Her vital signs and the triage nurse assessment. I have talked with the patient and any available family and advised that I am the provider in triage and have ordered the appropriate study to initiate their work up based on the clinical presentation during my assessment. I have advised that the patient will be accommodated in the Main ED as soon as possible. I have also requested to contact the triage nurse or myself immediately if the patient experiences any changes in their condition during this brief waiting period. Pt here for 4-5 days of chest pain. Patient denies hx VTE, recent immobilization, exogenous estrogen use, hemoptysis, unilateral leg pain/swelling.       HUONG Wilder

## 2018-03-26 NOTE — Clinical Note
- Ibuprofen as needed for pain. Robaxin as needed for muscle relaxant. Please follow-up with Dr. Conchis Griffith if pain not improvigin or any other concerns.

## 2018-03-27 LAB
ATRIAL RATE: 68 BPM
CALCULATED P AXIS, ECG09: 46 DEGREES
CALCULATED R AXIS, ECG10: 0 DEGREES
CALCULATED T AXIS, ECG11: 8 DEGREES
DIAGNOSIS, 93000: NORMAL
P-R INTERVAL, ECG05: 186 MS
Q-T INTERVAL, ECG07: 402 MS
QRS DURATION, ECG06: 84 MS
QTC CALCULATION (BEZET), ECG08: 427 MS
VENTRICULAR RATE, ECG03: 68 BPM

## 2018-03-27 NOTE — DISCHARGE INSTRUCTIONS
Chest Pain: Care Instructions  Your Care Instructions    There are many things that can cause chest pain. Some are not serious and will get better on their own in a few days. But some kinds of chest pain need more testing and treatment. Your doctor may have recommended a follow-up visit in the next 8 to 12 hours. If you are not getting better, you may need more tests or treatment. Even though your doctor has released you, you still need to watch for any problems. The doctor carefully checked you, but sometimes problems can develop later. If you have new symptoms or if your symptoms do not get better, get medical care right away. If you have worse or different chest pain or pressure that lasts more than 5 minutes or you passed out (lost consciousness), call 911 or seek other emergency help right away. A medical visit is only one step in your treatment. Even if you feel better, you still need to do what your doctor recommends, such as going to all suggested follow-up appointments and taking medicines exactly as directed. This will help you recover and help prevent future problems. How can you care for yourself at home? · Rest until you feel better. · Take your medicine exactly as prescribed. Call your doctor if you think you are having a problem with your medicine. · Do not drive after taking a prescription pain medicine. When should you call for help? Call 911 if:  ? · You passed out (lost consciousness). ? · You have severe difficulty breathing. ? · You have symptoms of a heart attack. These may include:  ¨ Chest pain or pressure, or a strange feeling in your chest.  ¨ Sweating. ¨ Shortness of breath. ¨ Nausea or vomiting. ¨ Pain, pressure, or a strange feeling in your back, neck, jaw, or upper belly or in one or both shoulders or arms. ¨ Lightheadedness or sudden weakness. ¨ A fast or irregular heartbeat.   After you call 911, the  may tell you to chew 1 adult-strength or 2 to 4 low-dose aspirin. Wait for an ambulance. Do not try to drive yourself. ?Call your doctor today if:  ? · You have any trouble breathing. ? · Your chest pain gets worse. ? · You are dizzy or lightheaded, or you feel like you may faint. ? · You are not getting better as expected. ? · You are having new or different chest pain. Where can you learn more? Go to http://adrián-david.info/. Enter A120 in the search box to learn more about \"Chest Pain: Care Instructions. \"  Current as of: March 20, 2017  Content Version: 11.4  © 4941-5119 [employer]. Care instructions adapted under license by [employer] (which disclaims liability or warranty for this information). If you have questions about a medical condition or this instruction, always ask your healthcare professional. [place] [address] any warranty or liability for your use of this information. Musculoskeletal Chest Pain: Care Instructions  Your Care Instructions    Chest pain is not always a sign that something is wrong with your heart or that you have another serious problem. The doctor thinks your chest pain is caused by strained muscles or ligaments, inflamed chest cartilage, or another problem in your chest, rather than by your heart. You may need more tests to find the cause of your chest pain. Follow-up care is a key part of your treatment and safety. Be sure to make and go to all appointments, and call your doctor if you are having problems. It's also a good idea to know your test results and keep a list of the medicines you take. How can you care for yourself at home? · Take pain medicines exactly as directed. ¨ If the doctor gave you a prescription medicine for pain, take it as prescribed. ¨ If you are not taking a prescription pain medicine, ask your doctor if you can take an over-the-counter medicine. · Rest and protect the sore area.   · Stop, change, or take a break from any activity that may be causing your pain or soreness. · Put ice or a cold pack on the sore area for 10 to 20 minutes at a time. Try to do this every 1 to 2 hours for the next 3 days (when you are awake) or until the swelling goes down. Put a thin cloth between the ice and your skin. · After 2 or 3 days, apply a heating pad set on low or a warm cloth to the area that hurts. Some doctors suggest that you go back and forth between hot and cold. · Do not wrap or tape your ribs for support. This may cause you to take smaller breaths, which could increase your risk of lung problems. · Mentholated creams such as Bengay or Icy Hot may soothe sore muscles. Follow the instructions on the package. · Follow your doctor's instructions for exercising. · Gentle stretching and massage may help you get better faster. Stretch slowly to the point just before pain begins, and hold the stretch for at least 15 to 30 seconds. Do this 3 or 4 times a day. Stretch just after you have applied heat. · As your pain gets better, slowly return to your normal activities. Any increased pain may be a sign that you need to rest a while longer. When should you call for help? Call 911 anytime you think you may need emergency care. For example, call if:  ? · You have chest pain or pressure. This may occur with:  ¨ Sweating. ¨ Shortness of breath. ¨ Nausea or vomiting. ¨ Pain that spreads from the chest to the neck, jaw, or one or both shoulders or arms. ¨ Dizziness or lightheadedness. ¨ A fast or uneven pulse. After calling 911, chew 1 adult-strength aspirin. Wait for an ambulance. Do not try to drive yourself. ? · You have sudden chest pain and shortness of breath, or you cough up blood. ?Call your doctor now or seek immediate medical care if:  ? · You have any trouble breathing. ? · Your chest pain gets worse. ? · Your chest pain occurs consistently with exercise and is relieved by rest.   ? Watch closely for changes in your health, and be sure to contact your doctor if:  ? · Your chest pain does not get better after 1 week. Where can you learn more? Go to http://adrián-david.info/. Enter V293 in the search box to learn more about \"Musculoskeletal Chest Pain: Care Instructions. \"  Current as of: March 20, 2017  Content Version: 11.4  © 4947-5708 [email or web address]. Care instructions adapted under license by [employer] (which disclaims liability or warranty for this information). If you have questions about a medical condition or this instruction, always ask your healthcare professional. [place] [address] any warranty or liability for your use of this information.

## 2018-03-27 NOTE — ED PROVIDER NOTES
HPI Comments: The patient present to the ED with chest pain. She developed chest pain on Thursday. She has a desk job. She denies any trigger to the pain. The pain is in her left chest and goes to her L shoulder. It comes and goes and lasts 10 seconds at a time. The pain is described as uncomfortable. She is unable to describe how often the pain comes. Pain is 7-8/10 and increased with palpation and certain movements of her left arm. She hasn't taken anything today for pain, but did take aspirin yesterday. She denies any shortness of breath, nausea, vomiting, or diaphoresis. No recent travel. She is not on any hormones. No family hx of PE/DVT. Father has CAD and had 4st stent/MI at age 50. He is now 61 and s/p CABG. She denies any other concerns. Patient is a 33 y.o. female presenting with chest pain. The history is provided by the patient. Chest Pain    Pertinent negatives include no abdominal pain, no cough, no fever, no headaches, no nausea, no shortness of breath, no vomiting and no weakness. Past Medical History:   Diagnosis Date    Chlamydia 2008    Dental caries 8/10/2012    Depression     Gonorrhea 2008    Lipoma of back        No past surgical history on file. Family History:   Problem Relation Age of Onset    Cancer Mother     Heart Disease Father        Social History     Social History    Marital status: SINGLE     Spouse name: N/A    Number of children: N/A    Years of education: N/A     Occupational History    Not on file. Social History Main Topics    Smoking status: Never Smoker    Smokeless tobacco: Never Used    Alcohol use No      Comment: occasional    Drug use: No    Sexual activity: Yes     Partners: Male     Birth control/ protection: None     Other Topics Concern    Not on file     Social History Narrative         ALLERGIES: Review of patient's allergies indicates no known allergies.     Review of Systems   Constitutional: Negative for appetite change, chills and fever. HENT: Negative for congestion, nosebleeds and sore throat. Eyes: Negative for pain and discharge. Respiratory: Negative for cough and shortness of breath. Cardiovascular: Positive for chest pain. Gastrointestinal: Negative for abdominal pain, diarrhea, nausea and vomiting. Genitourinary: Negative for dysuria. Musculoskeletal: Negative. Skin: Negative for rash. Neurological: Negative for weakness and headaches. Hematological: Negative for adenopathy. Psychiatric/Behavioral: Negative. All other systems reviewed and are negative. Vitals:    03/26/18 1946   BP: 137/87   Pulse: 89   Resp: 16   Temp: 98.8 °F (37.1 °C)   SpO2: 98%   Weight: 108.9 kg (240 lb)   Height: 5' 11\" (1.803 m)            Physical Exam   Constitutional: She is oriented to person, place, and time. She appears well-developed and well-nourished. HENT:   Head: Normocephalic and atraumatic. Mouth/Throat: Oropharynx is clear and moist.   Eyes: Conjunctivae are normal.   Neck: Normal range of motion. Neck supple. Cardiovascular: Normal rate, regular rhythm and normal heart sounds. Pulmonary/Chest: Effort normal and breath sounds normal. She exhibits tenderness (tenderness to L anterior chest. ). Abdominal: Soft. Bowel sounds are normal. There is no tenderness. Musculoskeletal: Normal range of motion. She exhibits no edema or tenderness. No significant pain with ROM of the shoulder. Neurological: She is alert and oriented to person, place, and time. Skin: Skin is warm and dry. Psychiatric: She has a normal mood and affect. Her behavior is normal.   Nursing note and vitals reviewed. [facility]      ED Course       Procedures    ED EKG interpretation:  Rhythm: normal sinus rhythm; and irregular - sinus arrhythmia. Rate (approx.): 68; Axis: normal; P wave: normal; QRS interval: normal ; ST/T wave: normal; . This EKG was interpreted by Jeffery Govea MD,ED Provider. A/P:  1.  Acute chest pain - low risk heart score. Suspect MSK pain. Trial of Ibuprofen and Robaxin. F/U with PCP.    10:20 PM  Patient's results have been reviewed with them. Patient and/or family have verbally conveyed their understanding and agreement of the patient's signs, symptoms, diagnosis, treatment and prognosis and additionally agree to follow up as recommended or return to the Emergency Room should their condition change prior to follow-up. Discharge instructions have also been provided to the patient with some educational information regarding their diagnosis as well a list of reasons why they would want to return to the ER prior to their follow-up appointment should their condition change.

## 2018-03-27 NOTE — ED NOTES
Pt d/c'd home, IV d/c'd-no complications noted. Pt given d/c packet and prescriptions given. Pt ambulated with balanced and steady gait to RAVINDRA whittaker VSS.

## 2018-06-07 ENCOUNTER — HOSPITAL ENCOUNTER (EMERGENCY)
Age: 33
Discharge: HOME | End: 2018-06-07
Attending: EMERGENCY MEDICINE
Payer: SELF-PAY

## 2018-06-07 VITALS
SYSTOLIC BLOOD PRESSURE: 123 MMHG | HEART RATE: 102 BPM | WEIGHT: 260 LBS | HEIGHT: 71 IN | OXYGEN SATURATION: 100 % | TEMPERATURE: 98.4 F | RESPIRATION RATE: 18 BRPM | DIASTOLIC BLOOD PRESSURE: 85 MMHG | BODY MASS INDEX: 36.4 KG/M2

## 2018-06-07 DIAGNOSIS — N39.0 URINARY TRACT INFECTION WITHOUT HEMATURIA, SITE UNSPECIFIED: Primary | ICD-10-CM

## 2018-06-07 LAB
ALBUMIN SERPL-MCNC: 3.8 G/DL (ref 3.5–5)
ALBUMIN/GLOB SERPL: 1 {RATIO} (ref 1.1–2.2)
ALP SERPL-CCNC: 129 U/L (ref 45–117)
ALT SERPL-CCNC: 20 U/L (ref 12–78)
ANION GAP SERPL CALC-SCNC: 8 MMOL/L (ref 5–15)
APPEARANCE UR: CLEAR
AST SERPL-CCNC: 8 U/L (ref 15–37)
BACTERIA URNS QL MICRO: ABNORMAL /HPF
BASOPHILS # BLD: 0 K/UL (ref 0–0.1)
BASOPHILS NFR BLD: 0 % (ref 0–1)
BILIRUB SERPL-MCNC: 0.3 MG/DL (ref 0.2–1)
BILIRUB UR QL: NEGATIVE
BUN SERPL-MCNC: 11 MG/DL (ref 6–20)
BUN/CREAT SERPL: 15 (ref 12–20)
CALCIUM SERPL-MCNC: 9.1 MG/DL (ref 8.5–10.1)
CHLORIDE SERPL-SCNC: 105 MMOL/L (ref 97–108)
CO2 SERPL-SCNC: 26 MMOL/L (ref 21–32)
COLOR UR: ABNORMAL
CREAT SERPL-MCNC: 0.72 MG/DL (ref 0.55–1.02)
DIFFERENTIAL METHOD BLD: NORMAL
EOSINOPHIL # BLD: 0.2 K/UL (ref 0–0.4)
EOSINOPHIL NFR BLD: 2 % (ref 0–7)
EPITH CASTS URNS QL MICRO: ABNORMAL /LPF
ERYTHROCYTE [DISTWIDTH] IN BLOOD BY AUTOMATED COUNT: 13.1 % (ref 11.5–14.5)
GLOBULIN SER CALC-MCNC: 3.9 G/DL (ref 2–4)
GLUCOSE SERPL-MCNC: 95 MG/DL (ref 65–100)
GLUCOSE UR STRIP.AUTO-MCNC: NEGATIVE MG/DL
HCG UR QL: NEGATIVE
HCT VFR BLD AUTO: 40.3 % (ref 35–47)
HGB BLD-MCNC: 13.1 G/DL (ref 11.5–16)
HGB UR QL STRIP: NEGATIVE
HYALINE CASTS URNS QL MICRO: ABNORMAL /LPF (ref 0–5)
IMM GRANULOCYTES # BLD: 0 K/UL (ref 0–0.04)
IMM GRANULOCYTES NFR BLD AUTO: 0 % (ref 0–0.5)
KETONES UR QL STRIP.AUTO: NEGATIVE MG/DL
LEUKOCYTE ESTERASE UR QL STRIP.AUTO: ABNORMAL
LIPASE SERPL-CCNC: 132 U/L (ref 73–393)
LYMPHOCYTES # BLD: 3.1 K/UL (ref 0.8–3.5)
LYMPHOCYTES NFR BLD: 33 % (ref 12–49)
MCH RBC QN AUTO: 28.7 PG (ref 26–34)
MCHC RBC AUTO-ENTMCNC: 32.5 G/DL (ref 30–36.5)
MCV RBC AUTO: 88.2 FL (ref 80–99)
MONOCYTES # BLD: 0.5 K/UL (ref 0–1)
MONOCYTES NFR BLD: 5 % (ref 5–13)
NEUTS SEG # BLD: 5.7 K/UL (ref 1.8–8)
NEUTS SEG NFR BLD: 60 % (ref 32–75)
NITRITE UR QL STRIP.AUTO: NEGATIVE
NRBC # BLD: 0 K/UL (ref 0–0.01)
NRBC BLD-RTO: 0 PER 100 WBC
PH UR STRIP: 6 [PH] (ref 5–8)
PLATELET # BLD AUTO: 321 K/UL (ref 150–400)
PMV BLD AUTO: 9 FL (ref 8.9–12.9)
POTASSIUM SERPL-SCNC: 3.9 MMOL/L (ref 3.5–5.1)
PROT SERPL-MCNC: 7.7 G/DL (ref 6.4–8.2)
PROT UR STRIP-MCNC: NEGATIVE MG/DL
RBC # BLD AUTO: 4.57 M/UL (ref 3.8–5.2)
RBC #/AREA URNS HPF: ABNORMAL /HPF (ref 0–5)
SODIUM SERPL-SCNC: 139 MMOL/L (ref 136–145)
SP GR UR REFRACTOMETRY: 1.02 (ref 1–1.03)
UR CULT HOLD, URHOLD: NORMAL
UROBILINOGEN UR QL STRIP.AUTO: 1 EU/DL (ref 0.2–1)
WBC # BLD AUTO: 9.5 K/UL (ref 3.6–11)
WBC URNS QL MICRO: ABNORMAL /HPF (ref 0–4)

## 2018-06-07 PROCEDURE — 87086 URINE CULTURE/COLONY COUNT: CPT | Performed by: EMERGENCY MEDICINE

## 2018-06-07 PROCEDURE — 81025 URINE PREGNANCY TEST: CPT

## 2018-06-07 PROCEDURE — 85025 COMPLETE CBC W/AUTO DIFF WBC: CPT | Performed by: NURSE PRACTITIONER

## 2018-06-07 PROCEDURE — 36415 COLL VENOUS BLD VENIPUNCTURE: CPT | Performed by: NURSE PRACTITIONER

## 2018-06-07 PROCEDURE — 83690 ASSAY OF LIPASE: CPT | Performed by: NURSE PRACTITIONER

## 2018-06-07 PROCEDURE — 99283 EMERGENCY DEPT VISIT LOW MDM: CPT

## 2018-06-07 PROCEDURE — 80053 COMPREHEN METABOLIC PANEL: CPT | Performed by: NURSE PRACTITIONER

## 2018-06-07 PROCEDURE — 81001 URINALYSIS AUTO W/SCOPE: CPT | Performed by: NURSE PRACTITIONER

## 2018-06-07 RX ORDER — CEPHALEXIN 500 MG/1
500 CAPSULE ORAL 3 TIMES DAILY
Qty: 20 CAP | Refills: 0 | Status: SHIPPED | OUTPATIENT
Start: 2018-06-07 | End: 2018-06-14

## 2018-06-07 RX ORDER — ONDANSETRON 2 MG/ML
4 INJECTION INTRAMUSCULAR; INTRAVENOUS
Status: DISCONTINUED | OUTPATIENT
Start: 2018-06-07 | End: 2018-06-07

## 2018-06-07 NOTE — DISCHARGE INSTRUCTIONS

## 2018-06-07 NOTE — ED PROVIDER NOTES
HPI Comments: 33 y.o. female with no significant past medical history who presents from home with chief complaint of abdominal pain. Pt reports 8/10 suprapubic abdominal pain for the last 3-4 days accompanied by urinary frequency. She states her sx also began w/ lower L back pain which has resolved since then. Pt notes she has been taking vitamin gummies w/ no relief. She states her last MP was \"at the beginning of March\", but she has taken several pregnancy tests (negative) and has been told she is not pregnant. She has missed her MP for 1-2 months in the past. When asked about OB, Pt states she is seen by [facility]. Pt denies dysuria, fever, and vaginal discharge. There are no other acute medical concerns at this time. PCP: Andra Feliciano MD    Note written by Nga Whitaker, as dictated by China Gracia MD 3:02 PM     The history is provided by the patient. Past Medical History:   Diagnosis Date    Chlamydia 2008    Dental caries 8/10/2012    Depression     Gonorrhea 2008    Lipoma of back        History reviewed. No pertinent surgical history. Family History:   Problem Relation Age of Onset    Cancer Mother     Heart Disease Father        Social History     Social History    Marital status: SINGLE     Spouse name: N/A    Number of children: N/A    Years of education: N/A     Occupational History    Not on file. Social History Main Topics    Smoking status: Never Smoker    Smokeless tobacco: Never Used    Alcohol use No      Comment: occasional    Drug use: No    Sexual activity: Yes     Partners: Male     Birth control/ protection: None     Other Topics Concern    Not on file     Social History Narrative         ALLERGIES: Review of patient's allergies indicates no known allergies. Review of Systems   Constitutional: Negative for appetite change, chills and fever. HENT: Negative for rhinorrhea, sore throat and trouble swallowing.     Eyes: Negative for photophobia. Respiratory: Negative for cough and shortness of breath. Cardiovascular: Negative for chest pain and palpitations. Gastrointestinal: Positive for abdominal pain. Negative for nausea and vomiting. Genitourinary: Positive for frequency. Negative for dysuria, hematuria and vaginal discharge. Musculoskeletal: Negative for arthralgias. Neurological: Negative for dizziness, syncope and weakness. Psychiatric/Behavioral: Negative for behavioral problems. The patient is not nervous/anxious. All other systems reviewed and are negative. Vitals:    06/07/18 1341   BP: 123/85   Pulse: (!) 102   Resp: 18   Temp: 98.4 °F (36.9 °C)   SpO2: 100%   Weight: 117.9 kg (260 lb)   Height: 5' 11\" (1.803 m)            Physical Exam   Constitutional: She appears well-developed and well-nourished. HENT:   Head: Normocephalic and atraumatic. Mouth/Throat: Oropharynx is clear and moist.   Eyes: EOM are normal. Pupils are equal, round, and reactive to light. Neck: Normal range of motion. Neck supple. Cardiovascular: Normal rate, regular rhythm, normal heart sounds and intact distal pulses. Exam reveals no gallop and no friction rub. No murmur heard. Pulmonary/Chest: Effort normal. No respiratory distress. She has no wheezes. She has no rales. Abdominal: Soft. There is tenderness (mild, suprapubic). There is no rebound. Musculoskeletal: Normal range of motion. She exhibits no tenderness. Neurological: She is alert. No cranial nerve deficit. Motor; symmetric   Skin: No erythema. Psychiatric: She has a normal mood and affect. Her behavior is normal.   Nursing note and vitals reviewed.   Note written by Nga Pappas, as dictated by Kennedy Mann MD 3:02 PM    [facility]      ED Course       Procedures

## 2018-06-07 NOTE — ED NOTES
1:39 PM  I have evaluated the patient as the Provider in Triage. I have reviewed Her vital signs and the triage nurse assessment. I have talked with the patient and any available family and advised that I am the provider in triage and have ordered the appropriate study to initiate their work up based on the clinical presentation during my assessment. I have advised that the patient will be accommodated in the Main ED as soon as possible. I have also requested to contact the triage nurse or myself immediately if the patient experiences any changes in their condition during this brief waiting period. Abdominal pain. HA since yesterday Patient denies: fevers, chills, nausea, vomiting, diarrhea, constipation,chest pain, shortness of breath or dyspnea on exertion. Nothing makes the symptoms better or worse.         Yoko Chávez, DEMETRIUS

## 2018-06-08 LAB
BACTERIA SPEC CULT: NORMAL
CC UR VC: NORMAL
SERVICE CMNT-IMP: NORMAL

## 2018-11-12 ENCOUNTER — APPOINTMENT (OUTPATIENT)
Dept: GENERAL RADIOLOGY | Age: 33
End: 2018-11-12
Attending: EMERGENCY MEDICINE
Payer: SELF-PAY

## 2018-11-12 ENCOUNTER — HOSPITAL ENCOUNTER (EMERGENCY)
Age: 33
Discharge: HOME | End: 2018-11-12
Attending: EMERGENCY MEDICINE
Payer: SELF-PAY

## 2018-11-12 VITALS
DIASTOLIC BLOOD PRESSURE: 84 MMHG | TEMPERATURE: 98.5 F | WEIGHT: 252.4 LBS | SYSTOLIC BLOOD PRESSURE: 122 MMHG | HEIGHT: 71 IN | BODY MASS INDEX: 35.34 KG/M2 | RESPIRATION RATE: 16 BRPM | HEART RATE: 82 BPM | OXYGEN SATURATION: 98 %

## 2018-11-12 DIAGNOSIS — R51.9 ACUTE NONINTRACTABLE HEADACHE, UNSPECIFIED HEADACHE TYPE: ICD-10-CM
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
## 2018-11-12 DIAGNOSIS — S46.819A STRAIN OF TRAPEZIUS MUSCLE, UNSPECIFIED LATERALITY, INITIAL ENCOUNTER: Primary | ICD-10-CM

## 2018-11-12 DIAGNOSIS — M54.6 ACUTE BILATERAL THORACIC BACK PAIN: ICD-10-CM

## 2018-11-12 PROCEDURE — 74011250637 HC RX REV CODE- 250/637: Performed by: EMERGENCY MEDICINE

## 2018-11-12 PROCEDURE — 99282 EMERGENCY DEPT VISIT SF MDM: CPT

## 2018-11-12 PROCEDURE — 71046 X-RAY EXAM CHEST 2 VIEWS: CPT

## 2018-11-12 RX ORDER — ONDANSETRON 4 MG/1
8 TABLET, ORALLY DISINTEGRATING ORAL
Status: COMPLETED | OUTPATIENT
Start: 2018-11-12 | End: 2018-11-12

## 2018-11-12 RX ORDER — CYCLOBENZAPRINE HCL 10 MG
10 TABLET ORAL
Status: COMPLETED | OUTPATIENT
Start: 2018-11-12 | End: 2018-11-12

## 2018-11-12 RX ORDER — IBUPROFEN 800 MG/1
800 TABLET ORAL
Qty: 21 TAB | Refills: 0 | Status: SHIPPED | OUTPATIENT
Start: 2018-11-12 | End: 2020-02-28

## 2018-11-12 RX ORDER — IBUPROFEN 400 MG/1
800 TABLET ORAL
Status: COMPLETED | OUTPATIENT
Start: 2018-11-12 | End: 2018-11-12

## 2018-11-12 RX ORDER — CYCLOBENZAPRINE HCL 10 MG
10 TABLET ORAL
Qty: 15 TAB | Refills: 0 | Status: SHIPPED | OUTPATIENT
Start: 2018-11-12 | End: 2020-02-28

## 2018-11-12 RX ADMIN — ONDANSETRON 8 MG: 4 TABLET, ORALLY DISINTEGRATING ORAL at 11:10

## 2018-11-12 RX ADMIN — IBUPROFEN 800 MG: 400 TABLET ORAL at 11:10

## 2018-11-12 RX ADMIN — CYCLOBENZAPRINE HYDROCHLORIDE 10 MG: 10 TABLET, FILM COATED ORAL at 11:10
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
## 2020-02-28 ENCOUNTER — HOSPITAL ENCOUNTER (EMERGENCY)
Age: 35
Discharge: HOME | End: 2020-02-28
Attending: EMERGENCY MEDICINE
Payer: SELF-PAY

## 2020-02-28 VITALS
HEART RATE: 100 BPM | TEMPERATURE: 98.1 F | HEIGHT: 71 IN | OXYGEN SATURATION: 100 % | RESPIRATION RATE: 18 BRPM | BODY MASS INDEX: 36.45 KG/M2 | SYSTOLIC BLOOD PRESSURE: 159 MMHG | DIASTOLIC BLOOD PRESSURE: 93 MMHG | WEIGHT: 260.36 LBS

## 2020-02-28 DIAGNOSIS — R03.0 ELEVATED BLOOD PRESSURE READING: ICD-10-CM

## 2020-02-28 DIAGNOSIS — N64.52 DISCHARGE FROM BOTH NIPPLES: Primary | ICD-10-CM

## 2020-02-28 LAB — HCG UR QL: NEGATIVE

## 2020-02-28 PROCEDURE — 81025 URINE PREGNANCY TEST: CPT

## 2020-02-28 PROCEDURE — 99283 EMERGENCY DEPT VISIT LOW MDM: CPT
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
## 2020-02-28 NOTE — DISCHARGE INSTRUCTIONS
Patient Education        Nipple Discharge: Care Instructions  Your Care Instructions  Fluid leaking from one or both nipples when you are not breastfeeding is called nipple discharge. Clear, cloudy, or white discharge that appears only when you press on your nipple is usually normal. The more the nipple is pressed or stimulated, the more fluid appears. Yellow, green, or brown discharge is not normal and may be a symptom of an infection or other problem. Spontaneous discharge appears without pressing or stimulating the nipple. This is not normal unless you are pregnant or breastfeeding. It may be a side effect of a medicine, or it may be caused by other health problems. The treatment of spontaneous nipple discharge depends on what is causing it. You may need additional tests to find out what is causing the nipple discharge. Follow-up care is a key part of your treatment and safety. Be sure to make and go to all appointments, and call your doctor if you are having problems. It's also a good idea to know your test results and keep a list of the medicines you take. How can you care for yourself at home? · If your doctor gave you medicine, take it exactly as prescribed. Call your doctor if you think you are having a problem with your medicine. · Wear a supportive bra, such as a sports bra or jog bra. · Avoid stimulating your breast until you have your follow-up appointment. When should you call for help? Call your doctor now or seek immediate medical care if:    · You have symptoms of a breast infection, such as:  ? Increased pain, swelling, redness, or warmth around a breast.  ? Red streaks extending from the breast.  ? Pus draining from a breast.  ? A fever.    Watch closely for changes in your health, and be sure to contact your doctor if:    · You notice any changes in your breast or discharge.     · You do not get better as expected. Where can you learn more?   Go to http://adrián-david.info/. Enter J595 in the search box to learn more about \"Nipple Discharge: Care Instructions. \"  Current as of: June 26, 2019  Content Version: 12.2  © 4314-2618 [employer]. Care instructions adapted under license by [employer] (which disclaims liability or warranty for this information). If you have questions about a medical condition or this instruction, always ask your healthcare professional. [address] any warranty or liability for your use of this information.

## 2020-02-28 NOTE — ED NOTES
I have reviewed discharge instructions with the patient. The patient verbalized understanding. Alert and stable to walk at discharge.

## 2020-03-16 ENCOUNTER — OFFICE VISIT (OUTPATIENT)
Dept: URGENT CARE | Age: 35
End: 2020-03-16

## 2020-03-16 VITALS
SYSTOLIC BLOOD PRESSURE: 137 MMHG | DIASTOLIC BLOOD PRESSURE: 85 MMHG | WEIGHT: 265 LBS | RESPIRATION RATE: 16 BRPM | BODY MASS INDEX: 37.1 KG/M2 | HEART RATE: 73 BPM | TEMPERATURE: 98.1 F | OXYGEN SATURATION: 99 % | HEIGHT: 71 IN

## 2020-03-16 DIAGNOSIS — K52.9 GASTROENTERITIS, ACUTE: Primary | ICD-10-CM

## 2020-03-16 PROBLEM — E66.01 SEVERE OBESITY (HCC): Status: ACTIVE | Noted: 2020-03-16

## 2020-03-16 RX ORDER — ONDANSETRON 4 MG/1
4 TABLET, ORALLY DISINTEGRATING ORAL
Qty: 10 TAB | Refills: 0 | Status: SHIPPED | OUTPATIENT
Start: 2020-03-16 | End: 2021-09-15 | Stop reason: ALTCHOICE

## 2020-03-16 NOTE — LETTER
NOTIFICATION RETURN TO WORK / SCHOOL 
 
3/16/2020 1:04 PM 
 
Ms. Nimesh Ulloa [address] [facility] 
[address] To Whom It May Concern: 
 
Nimesh Ulola is currently under the care of [address] [place] [address] for gastroenteritis. She will return to work/school on: 3/17/20. Excuse for missing work on 3/13/20-3/16/20. If there are questions or concerns please have the patient contact our office. Sincerely, Karime Gibson MD

## 2020-03-16 NOTE — PROGRESS NOTES
Vomiting    The history is provided by the patient. This is a new problem. The current episode started more than 2 days ago. The problem occurs 5 to 10 times per day. The problem has been gradually improving. There has been no fever. Associated symptoms include abdominal pain (mild), diarrhea, headaches and headaches. Pertinent negatives include no chills, no fever, no myalgias, no cough and no URI. The patient is not pregnant. Risk factors include ill contacts. Her pertinent negatives include no irritable bowel syndrome. Past Medical History:   Diagnosis Date    Chlamydia 2008    Dental caries 8/10/2012    Depression     Gonorrhea 2008    Lipoma of back         History reviewed. No pertinent surgical history.       Family History   Problem Relation Age of Onset    Cancer Mother     Heart Disease Father         Social History     Socioeconomic History    Marital status: SINGLE     Spouse name: Not on file    Number of children: Not on file    Years of education: Not on file    Highest education level: Not on file   Occupational History    Not on file   Social Needs    Financial resource strain: Not on file    Food insecurity     Worry: Not on file     Inability: Not on file    Transportation needs     Medical: Not on file     Non-medical: Not on file   Tobacco Use    Smoking status: Current Some Day Smoker    Smokeless tobacco: Never Used   Substance and Sexual Activity    Alcohol use: No     Comment: occasional    Drug use: No    Sexual activity: Yes     Partners: Male     Birth control/protection: None   Lifestyle    Physical activity     Days per week: Not on file     Minutes per session: Not on file    Stress: Not on file   Relationships    Social connections     Talks on phone: Not on file     Gets together: Not on file     Attends [employer] service: Not on file     Active member of club or organization: Not on file     Attends meetings of clubs or organizations: Not on file Relationship status: Not on file    Intimate partner violence     Fear of current or ex partner: Not on file     Emotionally abused: Not on file     Physically abused: Not on file     Forced sexual activity: Not on file   Other Topics Concern    Not on file   Social History Narrative    Not on file                ALLERGIES: Patient has no known allergies. Review of Systems   Constitutional: Negative for chills, fatigue and fever. Respiratory: Negative for cough. Gastrointestinal: Positive for abdominal pain (mild), diarrhea, nausea and vomiting. Musculoskeletal: Negative for myalgias. Neurological: Positive for headaches. All other systems reviewed and are negative. Vitals:    03/16/20 1243   BP: 137/85   Pulse: 73   Resp: 16   Temp: 98.1 °F (36.7 °C)   SpO2: 99%   Weight: 265 lb (120.2 kg)   Height: 5' 11\" (1.803 m)       Physical Exam  Vitals signs and nursing note reviewed. Constitutional:       General: She is not in acute distress. HENT:      Right Ear: Tympanic membrane and ear canal normal.      Left Ear: Tympanic membrane and ear canal normal.      Nose: Nose normal.      Mouth/Throat:      Pharynx: No oropharyngeal exudate or posterior oropharyngeal erythema. Eyes:      General: No scleral icterus. Right eye: No discharge. Left eye: No discharge. Conjunctiva/sclera: Conjunctivae normal.   Neck:      Musculoskeletal: Neck supple. Pulmonary:      Effort: Pulmonary effort is normal. No respiratory distress. Breath sounds: Normal breath sounds. No wheezing or rales. Abdominal:      General: Bowel sounds are normal. There is no distension. Palpations: Abdomen is soft. There is no mass. Tenderness: There is no abdominal tenderness. There is no guarding or rebound. Lymphadenopathy:      Cervical: No cervical adenopathy. Skin:     Findings: No rash. MDM    Procedures      ICD-10-CM ICD-9-CM    1.  Gastroenteritis, acute K52.9 558.9 ?viral      Medications Ordered Today   Medications    ondansetron (Zofran ODT) 4 mg disintegrating tablet     Sig: Take 1 Tab by mouth every eight (8) hours as needed for Nausea. Dispense:  10 Tab     Refill:  0     No results found for any visits on 03/16/20. The patients condition was discussed with the patient and they understand. The patient is to follow up with primary care doctor. If signs and symptoms become worse the pt is to go to the ER. The patient is to take medications as prescribed.

## 2020-03-16 NOTE — PATIENT INSTRUCTIONS
Gastroenteritis: Care Instructions  Your Care Instructions    Gastroenteritis is an illness that may cause nausea, vomiting, and diarrhea. It is sometimes called \"stomach flu. \" It can be caused by bacteria or a virus. You will probably begin to feel better in 1 to 2 days. In the meantime, get plenty of rest and make sure you do not become dehydrated. Dehydration occurs when your body loses too much fluid. Follow-up care is a key part of your treatment and safety. Be sure to make and go to all appointments, and call your doctor if you are having problems. It's also a good idea to know your test results and keep a list of the medicines you take. How can you care for yourself at home? · If your doctor prescribed antibiotics, take them as directed. Do not stop taking them just because you feel better. You need to take the full course of antibiotics. · Drink plenty of fluids to prevent dehydration, enough so that your urine is light yellow or clear like water. Choose water and other caffeine-free clear liquids until you feel better. If you have kidney, heart, or liver disease and have to limit fluids, talk with your doctor before you increase your fluid intake. · Drink fluids slowly, in frequent, small amounts, because drinking too much too fast can cause vomiting. · Begin eating mild foods, such as dry toast, yogurt, applesauce, bananas, and rice. Avoid spicy, hot, or high-fat foods, and do not drink alcohol or caffeine for a day or two. Do not drink milk or eat ice cream until you are feeling better. How to prevent gastroenteritis  · Keep hot foods hot and cold foods cold. · Do not eat meats, dressings, salads, or other foods that have been kept at room temperature for more than 2 hours. · Use a thermometer to check your refrigerator. It should be between 34°F and 40°F.  · Defrost meats in the refrigerator or microwave, not on the kitchen counter. · Keep your hands and your kitchen clean.  Wash your hands, cutting boards, and countertops with hot soapy water frequently. · Cook meat until it is well done. · Do not eat raw eggs or uncooked sauces made with raw eggs. · Do not take chances. If food looks or tastes spoiled, throw it out. When should you call for help? Call 911 anytime you think you may need emergency care. For example, call if:    · You vomit blood or what looks like coffee grounds.     · You passed out (lost consciousness).     · You pass maroon or very bloody stools.    Call your doctor now or seek immediate medical care if:    · You have severe belly pain.     · You have signs of needing more fluids. You have sunken eyes, a dry mouth, and pass only a little dark urine.     · You feel like you are going to faint.     · You have increased belly pain that does not go away in 1 to 2 days.     · You have new or increased nausea, or you are vomiting.     · You have a new or higher fever.     · Your stools are black and tarlike or have streaks of blood.    Watch closely for changes in your health, and be sure to contact your doctor if:    · You are dizzy or lightheaded.     · You urinate less than usual, or your urine is dark yellow or brown.     · You do not feel better with each day that goes by. Where can you learn more? Go to http://adrián-david.info/  Enter N142 in the search box to learn more about \"Gastroenteritis: Care Instructions. \"  Current as of: January 26, 2020Content Version: 12.4  © 8097-9797 [employer]. Care instructions adapted under license by [employer] (which disclaims liability or warranty for this information). If you have questions about a medical condition or this instruction, always ask your healthcare professional. [address] any warranty or liability for your use of this information.

## 2020-06-16 ENCOUNTER — VIRTUAL VISIT (OUTPATIENT)
Dept: FAMILY MEDICINE CLINIC | Age: 35
End: 2020-06-16

## 2020-06-16 ENCOUNTER — TELEPHONE (OUTPATIENT)
Dept: FAMILY MEDICINE CLINIC | Age: 35
End: 2020-06-16

## 2020-06-16 DIAGNOSIS — R53.83 OTHER FATIGUE: ICD-10-CM

## 2020-06-16 DIAGNOSIS — F39 MOOD DISORDER (HCC): Primary | ICD-10-CM

## 2020-06-16 DIAGNOSIS — E66.01 SEVERE OBESITY (HCC): ICD-10-CM

## 2020-06-16 DIAGNOSIS — F33.9 RECURRENT DEPRESSIVE DISORDER (HCC): ICD-10-CM

## 2020-06-16 DIAGNOSIS — Z71.89 ADVICE GIVEN ABOUT COVID-19 VIRUS INFECTION: ICD-10-CM

## 2020-06-16 NOTE — PROGRESS NOTES
HISTORY OF PRESENT ILLNESS  Amira Rossi is a 36 y.o. female. HPI   Today's Pt main concerns were provided on virtual visit and Video telemed format,  Present on VV for the concern of the current medical conditions,  pt is w/ comorbid history and unaware if the pt has been exposed to covid-19 individual,   Pt Have been staying at home for couple of wks,  pt has no fever no cough no dyspnea, no ha, not dizzy, nl smell nl taste, no N/V/D, no body ache. Depression with Anxiety state becomes lowery about to loose her job, stating that this is serious for her has to provide care for 3 kids, currently struggling at work, pt wt was 174lbs and now gained close to 100pounds,     Patient state that thins are not getting better: pt states that  unable to sleep,  unable to concentrate at work and at home at this time, +feeling anxius, no guilty feeling, has nl interest to do things, not depressed, nl appetite,  No Hoplessness, not wanted to heart self or others, no weight gain or loss, pt stay up all nights some days she is feeling great some days she feels very down,     In addition has excessive worry about a number of events and activities out of proportion, these have been presented for at least 6 months, the pt's anxiety not the results of unfamiliar person exposure or if the pt's been scrutinized by other, and is related to social or perfomramce issues at her work stating that she is not performing well at [employer], +fhx of bipolar and schizo with her mother, states that she is not pregnant     In addition pt states that there is no etoh or illicit drug use, not the first time,  no hx of physical nor of mental abuse, and currently is feeling safe in general.     Current Outpatient Medications   Medication Sig Dispense Refill    ondansetron (Zofran ODT) 4 mg disintegrating tablet Take 1 Tab by mouth every eight (8) hours as needed for Nausea.  (Patient not taking: Reported on 6/16/2020) 10 Tab 0     No Known Allergies  Past Medical History:   Diagnosis Date    Chlamydia 2008    Dental caries 8/10/2012    Depression     Gonorrhea 2008    Lipoma of back      History reviewed. No pertinent surgical history. Family History   Problem Relation Age of Onset    Cancer Mother     Heart Disease Father      Social History     Tobacco Use    Smoking status: Current Some Day Smoker    Smokeless tobacco: Never Used   Substance Use Topics    Alcohol use: No     Comment: occasional      Lab Results   Component Value Date/Time    WBC 9.5 06/07/2018 02:11 PM    HGB 13.1 06/07/2018 02:11 PM    HCT 40.3 06/07/2018 02:11 PM    PLATELET 418 06/03/2819 02:11 PM    MCV 88.2 06/07/2018 02:11 PM     Lab Results   Component Value Date/Time    TSH 0.88 05/13/2016 12:37 PM         Review of Systems   Constitutional: Positive for malaise/fatigue. Negative for chills and fever. HENT: Negative for ear pain and nosebleeds. Eyes: Negative for blurred vision, pain and discharge. Respiratory: Negative for shortness of breath. Cardiovascular: Negative for chest pain and leg swelling. Gastrointestinal: Negative for constipation, diarrhea, nausea and vomiting. Genitourinary: Negative for frequency. Musculoskeletal: Negative for joint pain and myalgias. Skin: Negative for itching and rash. Neurological: Negative for headaches. Psychiatric/Behavioral: Positive for depression. Negative for hallucinations, substance abuse and suicidal ideas. The patient is nervous/anxious and has insomnia. Physical Exam  Constitutional:       Appearance: She is obese. She is not ill-appearing or toxic-appearing. HENT:      Head: Normocephalic and atraumatic. Mouth/Throat:      Mouth: Mucous membranes are moist.   Neurological:      Mental Status: She is alert and oriented to person, place, and time. Psychiatric:         Attention and Perception: She is inattentive. She does not perceive auditory or visual hallucinations. Mood and Affect: Mood is anxious and depressed. Mood is not elated. Affect is flat and angry. Affect is not labile, blunt, tearful or inappropriate. Speech: She is communicative. Speech is rapid and pressured. Speech is not delayed, slurred or tangential.         Behavior: Behavior is agitated, slowed, withdrawn and hyperactive. Behavior is not aggressive or combative. Thought Content: Thought content is paranoid. Thought content is not delusional. Thought content does not include homicidal or suicidal ideation. Cognition and Memory: Cognition and memory normal. Memory is not impaired. She does not exhibit impaired recent memory or impaired remote memory. Judgment: Judgment normal. Judgment is not impulsive or inappropriate. ASSESSMENT and PLAN  Diagnoses and all orders for this visit:    1. Mood disorder (HCC)  -     cariprazine (VRAYLAR) 1.5 mg capsule; Take 1 Cap by mouth daily. 2. Advice given about COVID-19 virus infection  -     cariprazine (VRAYLAR) 1.5 mg capsule; Take 1 Cap by mouth daily. 3. Other fatigue  -     cariprazine (VRAYLAR) 1.5 mg capsule; Take 1 Cap by mouth daily. 4. Severe obesity (HCC)  -     cariprazine (VRAYLAR) 1.5 mg capsule; Take 1 Cap by mouth daily. 5. Recurrent depressive disorder (HCC)  -     cariprazine (VRAYLAR) 1.5 mg capsule; Take 1 Cap by mouth daily.       Depression with anxiety in a stable condition, not suicidal, start antianxiety and calming medication for now will reevaluate in 2 w for progression   in addition Counseling , social support, spiritual belonging, inc physical activity, all offered,  compliance advised, patient was told that should not mix any of current medication with any other illicit drugs, should not drink any alcoholic beverages while on such medication patient was also told to not operate any machinery while under the influence patient acknowledged understanding and agreed with today's recommendation in addition patient was told to call for any concern       Concern abdout COVID-19 addressed and detailed, pt was told that the best way to prevent illness is by protection, to Wear a facemask , having social distance, and to get tested if possible, Pursuant to the emergency declaration under the [address] and [place] [address] [facility] authority and the [employer] and [employer] Act, this Virtual Visit was conducted, with patient's consent, to reduce the patient's risk of exposure to COVID-19 and provide continuity of care for an established patient. Pt was also told if develop dyspnea needs to call 911 or go to er, call for lesly advise, pt agreed with todays recommendations, Services were provided through a Video synchronous discussion virtually to substitute for in-person appointment.

## 2020-06-16 NOTE — TELEPHONE ENCOUNTER
----- Message from Jono Sports sent at 6/16/2020  3:45 PM EDT -----  Regarding: Dr. Katherin Samano  Medication Refill    Caller (if not patient):      Relationship of caller (if not patient):      Best contact number(s):[phone]      Name of medication and dosage if known:      Is patient out of this medication (yes/no):yes      Pharm[facility] name:[facility]    Pharmacy listed in chart? (yes/no):yes  Pharmacy phone number:      Details to clarify the request: Patient says her insurance will not cover the medication Vidhi Patricia can you suggest an alternative medication and send to  Latoya Hollis

## 2020-06-16 NOTE — PATIENT INSTRUCTIONS

## 2020-06-16 NOTE — PROGRESS NOTES
Chief Complaint   Patient presents with   [address] [place] [address] Establish Care     1. Have you been to the ER, urgent care clinic since your last visit? Hospitalized since your last visit? No    2. Have you seen or consulted any other health care providers outside of the [address] [place] [address] since your last visit? Include any pap smears or colon screening. Yes When: 06/10/2020 Where: psychiatry Reason for visit: anxiety    Call placed to pt. Verified patient with two type of identifiers. here today to re-establish care, c/o headaches and increased anxiety.  Has psychiatry but has not been prescribed any medication to help with anxiety

## 2020-07-08 ENCOUNTER — VIRTUAL VISIT (OUTPATIENT)
Dept: FAMILY MEDICINE CLINIC | Age: 35
End: 2020-07-08
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
## 2020-07-08 DIAGNOSIS — M54.2 NECK PAIN: ICD-10-CM

## 2020-07-08 DIAGNOSIS — M25.511 ACUTE PAIN OF BOTH SHOULDERS: Primary | ICD-10-CM

## 2020-07-08 DIAGNOSIS — T14.8XXA MYALGIA, TRAUMATIC: ICD-10-CM

## 2020-07-08 DIAGNOSIS — M25.512 ACUTE PAIN OF BOTH SHOULDERS: Primary | ICD-10-CM

## 2020-07-08 DIAGNOSIS — S13.4XXA WHIPLASH INJURIES, INITIAL ENCOUNTER: ICD-10-CM

## 2020-07-08 RX ORDER — DICLOFENAC SODIUM 75 MG/1
75 TABLET, DELAYED RELEASE ORAL 2 TIMES DAILY
Qty: 12 TAB | Refills: 0 | Status: SHIPPED | OUTPATIENT
Start: 2020-07-08 | End: 2020-07-14

## 2020-07-08 RX ORDER — METHYLPREDNISOLONE 4 MG/1
TABLET ORAL
Qty: 1 DOSE PACK | Refills: 0 | Status: SHIPPED | OUTPATIENT
Start: 2020-07-08 | End: 2021-03-16 | Stop reason: ALTCHOICE
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
## 2021-03-16 ENCOUNTER — OFFICE VISIT (OUTPATIENT)
Dept: FAMILY MEDICINE CLINIC | Age: 36
End: 2021-03-16

## 2021-03-16 VITALS
DIASTOLIC BLOOD PRESSURE: 89 MMHG | HEART RATE: 71 BPM | BODY MASS INDEX: 37.15 KG/M2 | SYSTOLIC BLOOD PRESSURE: 135 MMHG | OXYGEN SATURATION: 97 % | TEMPERATURE: 97.2 F | RESPIRATION RATE: 16 BRPM | WEIGHT: 265.4 LBS | HEIGHT: 71 IN

## 2021-03-16 DIAGNOSIS — Z02.89 ENCOUNTER FOR COMPLETION OF FORM WITH PATIENT: ICD-10-CM

## 2021-03-16 DIAGNOSIS — R41.840 CONCENTRATION DEFICIT: ICD-10-CM

## 2021-03-16 DIAGNOSIS — K64.2 GRADE III HEMORRHOIDS: ICD-10-CM

## 2021-03-16 DIAGNOSIS — Z02.89 ENCOUNTER TO OBTAIN EXCUSE FROM WORK: ICD-10-CM

## 2021-03-16 DIAGNOSIS — N89.8 VAGINAL DISCHARGE: ICD-10-CM

## 2021-03-16 DIAGNOSIS — F39 MOOD DISORDER (HCC): Primary | ICD-10-CM

## 2021-03-16 DIAGNOSIS — F43.81 GRIEF REACTION WITH PROLONGED BEREAVEMENT: ICD-10-CM

## 2021-03-16 DIAGNOSIS — Z02.71 DISABILITY EXAMINATION: ICD-10-CM

## 2021-03-16 PROCEDURE — 99214 OFFICE O/P EST MOD 30 MIN: CPT | Performed by: FAMILY MEDICINE

## 2021-03-16 RX ORDER — HYDROCORTISONE 25 MG/G
CREAM TOPICAL 4 TIMES DAILY
Qty: 30 G | Refills: 0 | Status: SHIPPED | OUTPATIENT
Start: 2021-03-16 | End: 2021-09-15 | Stop reason: ALTCHOICE

## 2021-03-16 RX ORDER — METRONIDAZOLE 500 MG/1
500 TABLET ORAL 2 TIMES DAILY
Qty: 14 TAB | Refills: 0 | Status: SHIPPED | OUTPATIENT
Start: 2021-03-16 | End: 2021-03-23

## 2021-03-16 NOTE — PROGRESS NOTES
Chief Complaint   Patient presents with    Complete Physical     1. Have you been to the ER, urgent care clinic since your last visit? Hospitalized since your last visit? No    2. Have you seen or consulted any other health care providers outside of the [address] [place] [address] since your last visit? Include any pap smears or colon screening. Yes When: 2/21 Where: Dr Aurelio Espinoza (therapist) Reason for visit: bi polar follow up    verified patient with two type of identifiers.   c/o strong urine x 2 weeks, denies burning or frequency,  Also c/o hemorrhoids \"falling\" out with bowel movements   Declined flu vaccine
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
## 2021-03-16 NOTE — PATIENT INSTRUCTIONS
Grief (Actual/Anticipated): Care Instructions  Your Care Instructions     Grief is your emotional reaction to a major loss. The words \"sorrow\" and \"heartache\" often are used to describe feelings of grief. You feel grief when you lose a beloved person, pet, place, or thing. It is also natural to feel grief when you lose a valued way of life, such as a job, marriage, or good health. You may begin to grieve before a loss occurs. You may grieve for a loved one who is sick and dying. Children and adults often feel the pain of loss before a big move or divorce. This type of grief helps you get ready for a loss. Grief is different for each person. There is no \"normal\" or \"expected\" period of time for grieving. Some people adjust to their loss within a couple of months. Others may take 2 years or longer, especially if their lives were changed a lot or if the loss was sudden and shocking. Grieving can cause problems such as headaches, loss of appetite, and trouble with thinking or sleeping. You may withdraw from friends and family and behave in ways that are unusual for you. Grief may cause you to question your beliefs and views about life. Grief is natural and does not require medical treatment. But if you have trouble sleeping, it may help to take sleeping pills for a short time. It may help to talk with people who have been through or are going through similar losses. You may also want to talk to a counselor about your feelings. Talking about your loss, sharing your cares and concerns, and getting support from others are important parts of healthy grieving. Follow-up care is a key part of your treatment and safety. Be sure to make and go to all appointments, and call your doctor if you are having problems. It's also a good idea to know your test results and keep a list of the medicines you take. How can you care for yourself at home? · Get enough sleep. Your mind helps make sense of your life while you sleep. Missing sleep can lead to illness and make it harder for you to deal with your grief. · Eat healthy foods. Try to avoid eating only foods that give you comfort. Ask someone to join you for a meal if you do not like eating alone. Consider taking a multivitamin every day. · Get some exercise every day. Even a walk can help you deal with your grief. Other exercises, such as yoga, can also help you manage stress. · Comfort yourself. Take time to look at photos or use special items that make you feel better. · Stay involved in your life. Do not withdraw from the activities you enjoy. People you know at work, [employer]ism, clubs, or other groups can help you get through your period of grief. · Think about joining a support group to help you deal with your grief. There are many support groups to help people recover from grief. When should you call for help? Call 911 anytime you think you may need emergency care. For example, call if:    · You feel you cannot stop from hurting yourself or someone else. Watch closely for changes in your health, and be sure to contact your doctor if:    · You think you may be depressed.     · You do not get better as expected. Where can you learn more? Go to http://www.gray.com/  Enter H249 in the search box to learn more about \"Grief (Actual/Anticipated): Care Instructions. \"  Current as of: December 9, 2019               Content Version: 12.6  © 0661-3484 [employer]. Care instructions adapted under license by [employer] (which disclaims liability or warranty for this information). If you have questions about a medical condition or this instruction, always ask your healthcare professional. [address] any warranty or liability for your use of this information.

## 2021-03-30 ENCOUNTER — OFFICE VISIT (OUTPATIENT)
Dept: FAMILY MEDICINE CLINIC | Age: 36
End: 2021-03-30
Payer: COMMERCIAL

## 2021-03-30 VITALS
TEMPERATURE: 98.3 F | WEIGHT: 263.1 LBS | SYSTOLIC BLOOD PRESSURE: 120 MMHG | HEIGHT: 71 IN | BODY MASS INDEX: 36.83 KG/M2 | DIASTOLIC BLOOD PRESSURE: 86 MMHG | RESPIRATION RATE: 16 BRPM | HEART RATE: 90 BPM | OXYGEN SATURATION: 98 %

## 2021-03-30 DIAGNOSIS — F39 MOOD DISORDER (HCC): ICD-10-CM

## 2021-03-30 DIAGNOSIS — F43.81 GRIEF REACTION WITH PROLONGED BEREAVEMENT: ICD-10-CM

## 2021-03-30 DIAGNOSIS — Z02.89 ENCOUNTER TO OBTAIN EXCUSE FROM WORK: ICD-10-CM

## 2021-03-30 DIAGNOSIS — Z02.71 DISABILITY EXAMINATION: ICD-10-CM

## 2021-03-30 DIAGNOSIS — Z02.89 ENCOUNTER FOR COMPLETION OF FORM WITH PATIENT: ICD-10-CM

## 2021-03-30 PROCEDURE — 99214 OFFICE O/P EST MOD 30 MIN: CPT | Performed by: FAMILY MEDICINE

## 2021-03-30 NOTE — PROGRESS NOTES
Chief Complaint   Patient presents with    Documentation     1. Have you been to the ER, urgent care clinic since your last visit? Hospitalized since your last visit? No    2. Have you seen or consulted any other health care providers outside of the [address] [place] [address] since your last visit? Include any pap smears or colon screening. No     verified patient with two type of identifiers.   requesting to extend FMLA , c/o body aches after cleaning yard yesterday

## 2021-03-30 NOTE — PATIENT INSTRUCTIONS
Recovering From Depression: Care Instructions  Your Care Instructions     Taking good care of yourself is important as you recover from depression. In time, your symptoms will fade as your treatment takes hold. Do not give up. Instead, focus your energy on getting better.  Your mood will improve. It just takes some time. Focus on things that can help you feel better, such as being with friends and family, eating well, and getting enough rest. But take things slowly. Do not do too much too soon. You will begin to feel better gradually.  Follow-up care is a key part of your treatment and safety. Be sure to make and go to all appointments, and call your doctor if you are having problems. It's also a good idea to know your test results and keep a list of the medicines you take.  How can you care for yourself at home?  Be realistic  · If you have a large task to do, break it up into smaller steps you can handle, and just do what you can.  · You may want to put off important decisions until your depression has lifted. If you have plans that will have a major impact on your life, such as marriage, divorce, or a job change, try to wait a bit. Talk it over with friends and loved ones who can help you look at the overall picture first.  · Reaching out to people for help is important. Do not isolate yourself. Let your family and friends help you. Find someone you can trust and confide in, and talk to that person.  · Be patient, and be kind to yourself. Remember that depression is not your fault and is not something you can overcome with willpower alone. Treatment is important for depression, just like for any other illness. Feeling better takes time, and your mood will improve little by little.  Stay active  · Stay busy and get outside. Take a walk, or try some other light exercise.  · Talk with your doctor about an exercise program. Exercise can help with mild depression.  · Go to a movie or concert. Take part in a  Gnosticist activity or other social gathering. Go to a [employer] game. · Ask a friend to have dinner with you. Take care of yourself  · Eat a balanced diet with plenty of fresh fruits and vegetables, whole grains, and lean protein. If you have lost your appetite, eat small snacks rather than large meals. · Avoid using illegal drugs or marijuana and drinking alcohol. Do not take medicines that have not been prescribed for you. They may interfere with medicines you may be taking for depression, or they may make your depression worse. · Take your medicines exactly as they are prescribed. You may start to feel better within 1 to 3 weeks of taking antidepressant medicine. But it can take as many as 6 to 8 weeks to see more improvement. If you have questions or concerns about your medicines, or if you do not notice any improvement by 3 weeks, talk to your doctor. · Continue to take your medicine after your symptoms improve. Taking your medicine for at least 6 months after you feel better can help keep you from getting depressed again. If this isn't the first time you have been depressed, your doctor may recommend you to take medicine even longer. · If you have any side effects from your medicine, tell your doctor. Many side effects are mild and will go away on their own after you have been taking the medicine for a few weeks. Some may last longer. Talk to your doctor if side effects are bothering you too much. You might be able to try a different medicine. · Continue counseling. It may help prevent depression from returning, especially if you've had multiple episodes of depression. Talk with your counselor if you are having a hard time attending your sessions or you think the sessions aren't working. Don't just stop going. · Get enough sleep. Talk to your doctor if you are having problems sleeping. · Avoid sleeping pills unless they are prescribed by the doctor treating your depression.  Sleeping pills may make you groggy during the day, and they may interact with other medicine you are taking. · If you have any other illnesses, such as diabetes, heart disease, or high blood pressure, make sure to continue with your treatment. Tell your doctor about all of the medicines you take, including those with or without a prescription. · If you or someone you know talks about suicide, self-harm, or feeling hopeless, get help right away. Call the [address] [place] [address] at 1-800-273-talk (8-216.138.8725) or text HOME to 081178 to access the Crisis Text Line. Consider saving these numbers in your phone. When should you call for help? Call 911 anytime you think you may need emergency care. For example, call if:    · You feel like hurting yourself or someone else.     · Someone you know has depression and is about to attempt or is attempting suicide. Call your doctor now or seek immediate medical care if:    · You hear voices.     · Someone you know has depression and:  ? Starts to give away his or her possessions. ? Uses illegal drugs or drinks alcohol heavily. ? Talks or writes about death, including writing suicide notes or talking about guns, knives, or pills. ? Starts to spend a lot of time alone. ? Acts very aggressively or suddenly appears calm. Watch closely for changes in your health, and be sure to contact your doctor if:    · You do not get better as expected. Where can you learn more? Go to http://www.gray.com/  Enter N529 in the search box to learn more about \"Recovering From Depression: Care Instructions. \"  Current as of: January 31, 2020               Content Version: 12.6  © 9058-0994 [employer]. Care instructions adapted under license by [employer] (which disclaims liability or warranty for this information).  If you have questions about a medical condition or this instruction, always ask your healthcare professional. Kelley Oliveira disclaims any warranty or liability for your use of this information. Grief (Actual/Anticipated): Care Instructions  Your Care Instructions     Grief is your emotional reaction to a major loss. The words \"sorrow\" and \"heartache\" often are used to describe feelings of grief. You feel grief when you lose a beloved person, pet, place, or thing. It is also natural to feel grief when you lose a valued way of life, such as a job, marriage, or good health. You may begin to grieve before a loss occurs. You may grieve for a loved one who is sick and dying. Children and adults often feel the pain of loss before a big move or divorce. This type of grief helps you get ready for a loss. Grief is different for each person. There is no \"normal\" or \"expected\" period of time for grieving. Some people adjust to their loss within a couple of months. Others may take 2 years or longer, especially if their lives were changed a lot or if the loss was sudden and shocking. Grieving can cause problems such as headaches, loss of appetite, and trouble with thinking or sleeping. You may withdraw from friends and family and behave in ways that are unusual for you. Grief may cause you to question your beliefs and views about life. Grief is natural and does not require medical treatment. But if you have trouble sleeping, it may help to take sleeping pills for a short time. It may help to talk with people who have been through or are going through similar losses. You may also want to talk to a counselor about your feelings. Talking about your loss, sharing your cares and concerns, and getting support from others are important parts of healthy grieving. Follow-up care is a key part of your treatment and safety. Be sure to make and go to all appointments, and call your doctor if you are having problems. It's also a good idea to know your test results and keep a list of the medicines you take.   How can you care for yourself at home?  · Get enough sleep. Your mind helps make sense of your life while you sleep. Missing sleep can lead to illness and make it harder for you to deal with your grief. · Eat healthy foods. Try to avoid eating only foods that give you comfort. Ask someone to join you for a meal if you do not like eating alone. Consider taking a multivitamin every day. · Get some exercise every day. Even a walk can help you deal with your grief. Other exercises, such as yoga, can also help you manage stress. · Comfort yourself. Take time to look at photos or use special items that make you feel better. · Stay involved in your life. Do not withdraw from the activities you enjoy. People you know at work, [employer], clubs, or other groups can help you get through your period of grief. · Think about joining a support group to help you deal with your grief. There are many support groups to help people recover from grief. When should you call for help? Call 911 anytime you think you may need emergency care. For example, call if:    · You feel you cannot stop from hurting yourself or someone else. Watch closely for changes in your health, and be sure to contact your doctor if:    · You think you may be depressed.     · You do not get better as expected. Where can you learn more? Go to http://www.gray.com/  Enter H249 in the search box to learn more about \"Grief (Actual/Anticipated): Care Instructions. \"  Current as of: December 9, 2019               Content Version: 12.6  © 7417-5090 [facility], [employer]. Care instructions adapted under license by [employer] (which disclaims liability or warranty for this information). If you have questions about a medical condition or this instruction, always ask your healthcare professional. [address] any warranty or liability for your use of this information.
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
## 2021-04-05 ENCOUNTER — TELEPHONE (OUTPATIENT)
Dept: FAMILY MEDICINE CLINIC | Age: 36
End: 2021-04-05

## 2021-04-05 NOTE — TELEPHONE ENCOUNTER
Returned patient call and stated that paper work was completed and faxed off 3/30/2021. Patient would like a copy of the completed form. Will print off and put at front for patient to . Patient acknowledge.

## 2021-04-29 ENCOUNTER — HOSPITAL ENCOUNTER (EMERGENCY)
Age: 36
Discharge: HOME | End: 2021-04-29
Attending: EMERGENCY MEDICINE
Payer: COMMERCIAL

## 2021-04-29 ENCOUNTER — APPOINTMENT (OUTPATIENT)
Dept: ULTRASOUND IMAGING | Age: 36
End: 2021-04-29
Attending: EMERGENCY MEDICINE
Payer: COMMERCIAL

## 2021-04-29 VITALS
TEMPERATURE: 97.6 F | HEART RATE: 78 BPM | SYSTOLIC BLOOD PRESSURE: 142 MMHG | DIASTOLIC BLOOD PRESSURE: 91 MMHG | OXYGEN SATURATION: 100 % | RESPIRATION RATE: 18 BRPM

## 2021-04-29 DIAGNOSIS — O03.9 SPONTANEOUS MISCARRIAGE: Primary | ICD-10-CM

## 2021-04-29 DIAGNOSIS — N93.9 VAGINAL BLEEDING: ICD-10-CM

## 2021-04-29 LAB
ABO + RH BLD: NORMAL
ANION GAP SERPL CALC-SCNC: 8 MMOL/L (ref 5–15)
APPEARANCE UR: CLEAR
BACTERIA URNS QL MICRO: ABNORMAL /HPF
BASOPHILS # BLD: 0 K/UL (ref 0–0.1)
BASOPHILS NFR BLD: 1 % (ref 0–1)
BILIRUB UR QL: NEGATIVE
BLOOD BANK CMNT PATIENT-IMP: NORMAL
BUN SERPL-MCNC: 11 MG/DL (ref 6–20)
BUN/CREAT SERPL: 16 (ref 12–20)
CALCIUM SERPL-MCNC: 9 MG/DL (ref 8.5–10.1)
CHLORIDE SERPL-SCNC: 104 MMOL/L (ref 97–108)
CO2 SERPL-SCNC: 28 MMOL/L (ref 21–32)
COLOR UR: ABNORMAL
COMMENT, HOLDF: NORMAL
CREAT SERPL-MCNC: 0.7 MG/DL (ref 0.55–1.02)
DIFFERENTIAL METHOD BLD: NORMAL
EOSINOPHIL # BLD: 0.1 K/UL (ref 0–0.4)
EOSINOPHIL NFR BLD: 2 % (ref 0–7)
EPITH CASTS URNS QL MICRO: ABNORMAL /LPF
ERYTHROCYTE [DISTWIDTH] IN BLOOD BY AUTOMATED COUNT: 13.6 % (ref 11.5–14.5)
GLUCOSE SERPL-MCNC: 84 MG/DL (ref 65–100)
GLUCOSE UR STRIP.AUTO-MCNC: NEGATIVE MG/DL
HCG SERPL-ACNC: 2 MIU/ML (ref 0–6)
HCT VFR BLD AUTO: 41.4 % (ref 35–47)
HGB BLD-MCNC: 13.6 G/DL (ref 11.5–16)
HGB UR QL STRIP: ABNORMAL
HYALINE CASTS URNS QL MICRO: ABNORMAL /LPF (ref 0–5)
IMM GRANULOCYTES # BLD AUTO: 0 K/UL (ref 0–0.04)
IMM GRANULOCYTES NFR BLD AUTO: 0 % (ref 0–0.5)
KETONES UR QL STRIP.AUTO: ABNORMAL MG/DL
LEUKOCYTE ESTERASE UR QL STRIP.AUTO: ABNORMAL
LYMPHOCYTES # BLD: 2.7 K/UL (ref 0.8–3.5)
LYMPHOCYTES NFR BLD: 32 % (ref 12–49)
MCH RBC QN AUTO: 29.1 PG (ref 26–34)
MCHC RBC AUTO-ENTMCNC: 32.9 G/DL (ref 30–36.5)
MCV RBC AUTO: 88.5 FL (ref 80–99)
MONOCYTES # BLD: 0.6 K/UL (ref 0–1)
MONOCYTES NFR BLD: 7 % (ref 5–13)
NEUTS SEG # BLD: 5 K/UL (ref 1.8–8)
NEUTS SEG NFR BLD: 58 % (ref 32–75)
NITRITE UR QL STRIP.AUTO: POSITIVE
NRBC # BLD: 0 K/UL (ref 0–0.01)
NRBC BLD-RTO: 0 PER 100 WBC
PH UR STRIP: 6.5 [PH] (ref 5–8)
PLATELET # BLD AUTO: 353 K/UL (ref 150–400)
PMV BLD AUTO: 9 FL (ref 8.9–12.9)
POTASSIUM SERPL-SCNC: 3.3 MMOL/L (ref 3.5–5.1)
PROT UR STRIP-MCNC: 100 MG/DL
RBC # BLD AUTO: 4.68 M/UL (ref 3.8–5.2)
RBC #/AREA URNS HPF: >100 /HPF (ref 0–5)
SAMPLES BEING HELD,HOLD: NORMAL
SODIUM SERPL-SCNC: 140 MMOL/L (ref 136–145)
SP GR UR REFRACTOMETRY: 1.02 (ref 1–1.03)
UROBILINOGEN UR QL STRIP.AUTO: 1 EU/DL (ref 0.2–1)
WBC # BLD AUTO: 8.5 K/UL (ref 3.6–11)
WBC URNS QL MICRO: ABNORMAL /HPF (ref 0–4)

## 2021-04-29 PROCEDURE — 84702 CHORIONIC GONADOTROPIN TEST: CPT

## 2021-04-29 PROCEDURE — 87086 URINE CULTURE/COLONY COUNT: CPT

## 2021-04-29 PROCEDURE — 36415 COLL VENOUS BLD VENIPUNCTURE: CPT

## 2021-04-29 PROCEDURE — 87077 CULTURE AEROBIC IDENTIFY: CPT

## 2021-04-29 PROCEDURE — 87186 SC STD MICRODIL/AGAR DIL: CPT

## 2021-04-29 PROCEDURE — 80048 BASIC METABOLIC PNL TOTAL CA: CPT

## 2021-04-29 PROCEDURE — 76801 OB US < 14 WKS SINGLE FETUS: CPT

## 2021-04-29 PROCEDURE — 99283 EMERGENCY DEPT VISIT LOW MDM: CPT

## 2021-04-29 PROCEDURE — 86900 BLOOD TYPING SEROLOGIC ABO: CPT

## 2021-04-29 PROCEDURE — 81001 URINALYSIS AUTO W/SCOPE: CPT

## 2021-04-29 PROCEDURE — 85025 COMPLETE CBC W/AUTO DIFF WBC: CPT

## 2021-04-29 PROCEDURE — 76817 TRANSVAGINAL US OBSTETRIC: CPT

## 2021-04-29 NOTE — LETTER
5/6/2021 Deena Narvaez [address] Dear Ms. Hood, You were recently seen in the Emergency Department and had several tests performed as part of your evaluation. There are additional results, not available during your visit, that require your immediate attention. It is important that we discuss these results with you. Please call [phone] to speak with one of our providers as soon as possible. If you reach a voice mail, please leave your name, date of birth, phone number, and the best time to return your call. Sincerely, Luisa Rodriguez MD 
Chief Medical Officer [address] [place] [address], [address] [place]  
[phone]

## 2021-04-29 NOTE — ED TRIAGE NOTES
TRIAGE NOTE:  Patient arrives with c/o spotting, patient reports found out a week ago she was pregnant. Patient reports she is now having \"gushing\". Patient's OB is Dr. González Giles.
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
## 2021-05-01 LAB
BACTERIA SPEC CULT: ABNORMAL
CC UR VC: ABNORMAL
SERVICE CMNT-IMP: ABNORMAL

## 2021-05-06 NOTE — PROGRESS NOTES
Attempted to reach patient again, voicemail full, unable to leave message.   Will send registered letter

## 2021-05-26 ENCOUNTER — VIRTUAL VISIT (OUTPATIENT)
Dept: FAMILY MEDICINE CLINIC | Age: 36
End: 2021-05-26
Payer: COMMERCIAL

## 2021-05-26 DIAGNOSIS — Z00.00 LABORATORY EXAM ORDERED AS PART OF ROUTINE GENERAL MEDICAL EXAMINATION: ICD-10-CM

## 2021-05-26 DIAGNOSIS — Z23 ENCOUNTER FOR IMMUNIZATION: ICD-10-CM

## 2021-05-26 DIAGNOSIS — F43.21 COMPLICATED GRIEF: ICD-10-CM

## 2021-05-26 DIAGNOSIS — F43.0 PANIC ATTACK AS REACTION TO STRESS: ICD-10-CM

## 2021-05-26 DIAGNOSIS — Z00.00 WELL WOMAN EXAM (NO GYNECOLOGICAL EXAM): Primary | ICD-10-CM

## 2021-05-26 DIAGNOSIS — F41.0 PANIC ATTACK AS REACTION TO STRESS: ICD-10-CM

## 2021-05-26 DIAGNOSIS — F06.4 ANXIETY DISORDER DUE TO KNOWN PHYSIOLOGICAL CONDITION: ICD-10-CM

## 2021-05-26 PROCEDURE — 99213 OFFICE O/P EST LOW 20 MIN: CPT | Performed by: FAMILY MEDICINE

## 2021-05-26 PROCEDURE — 99395 PREV VISIT EST AGE 18-39: CPT | Performed by: FAMILY MEDICINE

## 2021-05-26 RX ORDER — ALPRAZOLAM 0.5 MG/1
0.5 TABLET ORAL
Qty: 15 TABLET | Refills: 0 | OUTPATIENT
Start: 2021-05-26 | End: 2022-01-01
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
## 2021-05-26 NOTE — PROGRESS NOTES
Chief Complaint   Patient presents with    Complete Physical     1. Have you been to the ER, urgent care clinic since your last visit? Hospitalized since your last visit? Yes When: 4/29/21 Where: [facility] Reason for visit: miscarriage, vaginal bleeding    2. Have you seen or consulted any other health care providers outside of the [address] [place] [address] since your last visit? Include any pap smears or colon screening. No    Call placed to pt. Verified patient with two type of identifiers.  Denies c/o present

## 2021-06-02 ENCOUNTER — OFFICE VISIT (OUTPATIENT)
Dept: FAMILY MEDICINE CLINIC | Age: 36
End: 2021-06-02
Payer: COMMERCIAL

## 2021-06-02 VITALS
WEIGHT: 263.2 LBS | RESPIRATION RATE: 18 BRPM | OXYGEN SATURATION: 98 % | HEIGHT: 71 IN | DIASTOLIC BLOOD PRESSURE: 88 MMHG | SYSTOLIC BLOOD PRESSURE: 153 MMHG | BODY MASS INDEX: 36.85 KG/M2 | HEART RATE: 75 BPM

## 2021-06-02 DIAGNOSIS — Z02.89 ENCOUNTER TO OBTAIN EXCUSE FROM WORK: ICD-10-CM

## 2021-06-02 DIAGNOSIS — F33.2 SEVERE EPISODE OF RECURRENT MAJOR DEPRESSIVE DISORDER, WITHOUT PSYCHOTIC FEATURES (HCC): ICD-10-CM

## 2021-06-02 DIAGNOSIS — Z02.89 ENCOUNTER FOR COMPLETION OF FORM WITH PATIENT: ICD-10-CM

## 2021-06-02 DIAGNOSIS — F39 MOOD DISORDER (HCC): Primary | ICD-10-CM

## 2021-06-02 DIAGNOSIS — Z02.71 DISABILITY EXAMINATION: ICD-10-CM

## 2021-06-02 PROCEDURE — 99214 OFFICE O/P EST MOD 30 MIN: CPT | Performed by: FAMILY MEDICINE

## 2021-06-02 NOTE — PATIENT INSTRUCTIONS
Grief (Actual/Anticipated): Care Instructions  Your Care Instructions     Grief is your emotional reaction to a major loss. The words \"sorrow\" and \"heartache\" often are used to describe feelings of grief. You feel grief when you lose a beloved person, pet, place, or thing. It is also natural to feel grief when you lose a valued way of life, such as a job, marriage, or good health. You may begin to grieve before a loss occurs. You may grieve for a loved one who is sick and dying. Children and adults often feel the pain of loss before a big move or divorce. This type of grief helps you get ready for a loss. Grief is different for each person. There is no \"normal\" or \"expected\" period of time for grieving. Some people adjust to their loss within a couple of months. Others may take 2 years or longer, especially if their lives were changed a lot or if the loss was sudden and shocking. Grieving can cause problems such as headaches, loss of appetite, and trouble with thinking or sleeping. You may withdraw from friends and family and behave in ways that are unusual for you. Grief may cause you to question your beliefs and views about life. Grief is natural and does not require medical treatment. But if you have trouble sleeping, it may help to take sleeping pills for a short time. It may help to talk with people who have been through or are going through similar losses. You may also want to talk to a counselor about your feelings. Talking about your loss, sharing your cares and concerns, and getting support from others are important parts of healthy grieving. Follow-up care is a key part of your treatment and safety. Be sure to make and go to all appointments, and call your doctor if you are having problems. It's also a good idea to know your test results and keep a list of the medicines you take. How can you care for yourself at home? · Get enough sleep. Your mind helps make sense of your life while you sleep. Missing sleep can lead to illness and make it harder for you to deal with your grief. · Eat healthy foods. Try to avoid eating only foods that give you comfort. Ask someone to join you for a meal if you do not like eating alone. Consider taking a multivitamin every day. · Get some exercise every day. Even a walk can help you deal with your grief. Other exercises, such as yoga, can also help you manage stress. · Comfort yourself. Take time to look at photos or use special items that make you feel better. · Stay involved in your life. Do not withdraw from the activities you enjoy. People you know at work, [employer], clubs, or other groups can help you get through your period of grief. · Think about joining a support group to help you deal with your grief. There are many support groups to help people recover from grief. When should you call for help? Call 911 anytime you think you may need emergency care. For example, call if:    · You feel you cannot stop from hurting yourself or someone else. Watch closely for changes in your health, and be sure to contact your doctor if:    · You think you may be depressed.     · You do not get better as expected. Where can you learn more? Go to http://www.gray.com/  Enter H249 in the search box to learn more about \"Grief (Actual/Anticipated): Care Instructions. \"  Current as of: July 17, 2020               Content Version: 12.8  © 6193-6761 [employer]. Care instructions adapted under license by [employer] (which disclaims liability or warranty for this information). If you have questions about a medical condition or this instruction, always ask your healthcare professional. [place] [address] any warranty or liability for your use of this information. Recovering From Depression: Care Instructions  Your Care Instructions     Taking good care of yourself is important as you recover from depression. In time, your symptoms will fade as your treatment takes hold. Do not give up. Instead, focus your energy on getting better. Your mood will improve. It just takes some time. Focus on things that can help you feel better, such as being with friends and family, eating well, and getting enough rest. But take things slowly. Do not do too much too soon. You will begin to feel better gradually. Follow-up care is a key part of your treatment and safety. Be sure to make and go to all appointments, and call your doctor if you are having problems. It's also a good idea to know your test results and keep a list of the medicines you take. How can you care for yourself at home? Be realistic  · If you have a large task to do, break it up into smaller steps you can handle, and just do what you can. · You may want to put off important decisions until your depression has lifted. If you have plans that will have a major impact on your life, such as marriage, divorce, or a job change, try to wait a bit. Talk it over with friends and loved ones who can help you look at the overall picture first.  · Reaching out to people for help is important. Do not isolate yourself. Let your family and friends help you. Find someone you can trust and confide in, and talk to that person. · Be patient, and be kind to yourself. Remember that depression is not your fault and is not something you can overcome with willpower alone. Treatment is important for depression, just like for any other illness. Feeling better takes time, and your mood will improve little by little. Stay active  · Stay busy and get outside. Take a walk, or try some other light exercise. · Talk with your doctor about an exercise program. Exercise can help with mild depression. · Go to a movie or concert. Take part in a Oriental orthodox activity or other social gathering. Go to a ball game. · Ask a friend to have dinner with you.   Take care of yourself  · Eat a balanced diet with plenty of fresh fruits and vegetables, whole grains, and lean protein. If you have lost your appetite, eat small snacks rather than large meals. · Avoid using illegal drugs or marijuana and drinking alcohol. Do not take medicines that have not been prescribed for you. They may interfere with medicines you may be taking for depression, or they may make your depression worse. · Take your medicines exactly as they are prescribed. You may start to feel better within 1 to 3 weeks of taking antidepressant medicine. But it can take as many as 6 to 8 weeks to see more improvement. If you have questions or concerns about your medicines, or if you do not notice any improvement by 3 weeks, talk to your doctor. · Continue to take your medicine after your symptoms improve. Taking your medicine for at least 6 months after you feel better can help keep you from getting depressed again. If this isn't the first time you have been depressed, your doctor may recommend you to take medicine even longer. · If you have any side effects from your medicine, tell your doctor. Many side effects are mild and will go away on their own after you have been taking the medicine for a few weeks. Some may last longer. Talk to your doctor if side effects are bothering you too much. You might be able to try a different medicine. · Continue counseling. It may help prevent depression from returning, especially if you've had multiple episodes of depression. Talk with your counselor if you are having a hard time attending your sessions or you think the sessions aren't working. Don't just stop going. · Get enough sleep. Talk to your doctor if you are having problems sleeping. · Avoid sleeping pills unless they are prescribed by the doctor treating your depression. Sleeping pills may make you groggy during the day, and they may interact with other medicine you are taking.   · If you have any other illnesses, such as diabetes, heart disease, or high blood pressure, make sure to continue with your treatment. Tell your doctor about all of the medicines you take, including those with or without a prescription. · If you or someone you know talks about suicide, self-harm, or feeling hopeless, get help right away. Call the [facility] at 2-175-150-OAQU (2-182.643.7585) or text HOME to 567223 to access the Crisis Text Line. Consider saving these numbers in your phone. When should you call for help? Call 911 anytime you think you may need emergency care. For example, call if:    · You feel like hurting yourself or someone else.     · Someone you know has depression and is about to attempt or is attempting suicide. Call your doctor now or seek immediate medical care if:    · You hear voices.     · Someone you know has depression and:  ? Starts to give away his or her possessions. ? Uses illegal drugs or drinks alcohol heavily. ? Talks or writes about death, including writing suicide notes or talking about guns, knives, or pills. ? Starts to spend a lot of time alone. ? Acts very aggressively or suddenly appears calm. Watch closely for changes in your health, and be sure to contact your doctor if:    · You do not get better as expected. Where can you learn more? Go to http://www.gray.com/  Enter N529 in the search box to learn more about \"Recovering From Depression: Care Instructions. \"  Current as of: September 23, 2020               Content Version: 12.8  © 2006-2021 [employer]. Care instructions adapted under license by [employer] (which disclaims liability or warranty for this information). If you have questions about a medical condition or this instruction, always ask your healthcare professional. [place] [address] any warranty or liability for your use of this information.

## 2021-06-02 NOTE — PROGRESS NOTES
Chief Complaint   Patient presents with    Documentation     fmla     1. Have you been to the ER, urgent care clinic since your last visit? Hospitalized since your last visit? No    2. Have you seen or consulted any other health care providers outside of the [address] [place] [address] since your last visit? Include any pap smears or colon screening. No    verified patient with two type of identifiers. requesting to extend FMLA.   also stated she needed her tsh checked due to increase in weight as discussed in previous visit
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
## 2021-07-08 RX ORDER — LURASIDONE HYDROCHLORIDE 20 MG/1
TABLET, FILM COATED ORAL
Qty: 30 TABLET | Refills: 0 | Status: SHIPPED | OUTPATIENT
Start: 2021-07-08 | End: 2021-08-12 | Stop reason: ALTCHOICE

## 2021-08-12 ENCOUNTER — VIRTUAL VISIT (OUTPATIENT)
Dept: FAMILY MEDICINE CLINIC | Age: 36
End: 2021-08-12

## 2021-08-12 DIAGNOSIS — Z02.89 ENCOUNTER FOR COMPLETION OF FORM WITH PATIENT: ICD-10-CM

## 2021-08-12 DIAGNOSIS — F43.21 COMPLICATED GRIEF: ICD-10-CM

## 2021-08-12 DIAGNOSIS — F43.81 GRIEF REACTION WITH PROLONGED BEREAVEMENT: ICD-10-CM

## 2021-08-12 DIAGNOSIS — F41.0 PANIC ATTACK AS REACTION TO STRESS: ICD-10-CM

## 2021-08-12 DIAGNOSIS — F39 MOOD DISORDER (HCC): ICD-10-CM

## 2021-08-12 DIAGNOSIS — Z02.71 DISABILITY EXAMINATION: ICD-10-CM

## 2021-08-12 DIAGNOSIS — F06.4 ANXIETY DISORDER DUE TO KNOWN PHYSIOLOGICAL CONDITION: ICD-10-CM

## 2021-08-12 DIAGNOSIS — F43.0 PANIC ATTACK AS REACTION TO STRESS: ICD-10-CM

## 2021-08-12 DIAGNOSIS — Z02.89 ENCOUNTER TO OBTAIN EXCUSE FROM WORK: ICD-10-CM

## 2021-08-12 RX ORDER — ALPRAZOLAM 0.5 MG/1
0.5 TABLET ORAL
Qty: 15 TABLET | Refills: 0 | Status: CANCELLED | OUTPATIENT
Start: 2021-08-12

## 2021-08-12 NOTE — LETTER
AGREEMENT for controlled medication treatment    I, Zay Loco, have agreed to a course of treatment that includes taking controlled medication. For this treatment I designate _____________________________________ as the [facility] Provider.     The purpose of this agreement is to prevent misunderstandings about controlled medications I may be taking for treatment, and to comply with applicable laws. I understand this agreement is essential to the trust and confidence necessary in a provider-patient relationship and that the designated provider will treat me in accordance with the statements below. As part of my treatment I agree to the followin.  USE  a. I will take the controlled medication as instructed by the designated provider and avoid improper use of controlled medications. b.   I will not share, sell or trade controlled medication with anyone as this is a criminal offense.   c.   I will not use any illegal controlled substance, including marijuana, cocaine, etc. as this is a criminal offense. 2.  PROVIDERS  a. I will only obtain controlled medication from the designated provider. b.   I will not attempt to obtain the same or similar controlled medications, such as opioid pain medication, stimulants, anti-anxiety or hypnotics from any other provider as this is a criminal offense.  c.   I will inform the designated provider about all other licensed professionals providing medical care to me and authorize communication between all providers to coordinate care, particularly prescribing or dispensing of controlled medications. 3.  PHARMACY  a.   I will only fill controlled medication prescriptions at the approved pharmacy as listed below. 4.  OFFICE VISITS  a. I agree to attend scheduled office visit appointments. b.   I am aware my office visits may be monthly or as determined necessary by the designated provider.   c.   I will communicate fully with the designated provider about the character and intensity of my symptoms, the effect of the symptoms on my daily life, and how well the controlled medication is helping to relieve the cause of my symptoms. 5.  REFILLS OR CALL-IN PRESCRIPTIONS OF CONTROLLED MEDICATIONS  a. I agree that refills of my prescriptions for controlled medications will be made at the time of an office visit during regular office hours. No refills will be available during evenings or on weekends. Abusive or inappropriate behavior related to medication refills will not be tolerated. b.   I will not call the office repeatedly to inquire about my controlled medication. I understand that medications will be written on the due date and not before. Calling the office repeatedly will be considered harassment, and I may be discharged from the practice. c.   Controlled medications may not be called in for refill, but doing so is at the discretion of the designated provider. d.   I am aware that only I must  prescriptions for controlled medications at the office but the designated provider may allow a designee to  the prescription from the office under very specific circumstance that may develop. 6.  TOXICOLOGY SCREENING  a. I agree to random urine toxicology screenings in order to comply with government and [address] [place] [address] regulations. I understand that I will be financially responsible for any charges incurred for the urine toxicology screening, which may not be covered by my insurance. Failure to do so will be considered non-compliance and I may be discharged. b. In some cases an oral swab or hair sample may be substituted for a urine screen. This is at the discretion of the designated provider.   I understand that I will be financially responsible for any charges incurred as well.  c.   I understand that if the urine toxicology does not show my medications prescribed to me by the designated provider, or it shows any illegal substance or any other medications NOT prescribed by the designated providers, I may be discharged from this practice at the discretion of the designated provider and no further controlled medications will be prescribed or follow-up appointments scheduled. Also, if any illegal substances are detected on the urine toxicology, this information may be provided to local law enforcement. 7. PILL COUNTS  a. I am aware I may be called at any time to come into the office for a count of all my remaining controlled medications in order to help the designated provider understand the rate at which I use my controlled medications and to more effectively adjust dosage. b. I agree that I will use my medication at a rate NO greater than the prescribed rate and that use of my medication at a greater rate will result in my being without medication for the period of time until next expected due date. c. I will bring in the containers with the medication prescribed by all providers, including the designated provider, to each office visit even if there is no medication remaining. All controlled medication will be in the original containers from the pharmacy for each medication. Failure to do so will be considered non-compliance and I may be discharged from the practice. 8.  LOSS OR THEFT OF MEDICATION  a. I will safeguard my controlled medication from loss or theft. b.   Lost or stolen controlled medication may not be replaced. This includes a prescription that has not yet been filled at the pharmacy. c.   In the event my controlled medications are stolen or lost, I will notify the designated providers office immediately. If such event occurs during the night, weekend or holiday, I will leave a detailed message on the answering machine or answering service at the number listed above.  d.   I will file and produce an official police report for any effort to replace controlled medications prescribed.     9.  AGENCY COLLABORATION  I authorize the designated provider and the authorized pharmacy/pharmacist to cooperate fully with any city, state, or federal law enforcement agency in the investigation of any possible misuse, sale or other diversion of my controlled medication. 10.  TREATMENT  I understand that if I violate any of the conditions, my controlled medication and/or treatment will be terminated. If the violation involves obtaining controlled substances and/or dangerous drugs from another source, the incident may be reported to other medical facilities and authorities, including law enforcement. In this case, the designated provider may taper off the medication over a period of several days, as necessary, to avoid withdrawal symptoms or will suggest alternate treatment facilities. Also, a drug dependence treatment program may be recommended. 11.  AGREEMENT  I agree to follow these guidelines that have been fully explained to me. All of my questions and concerns regarding treatment have been adequately answered. A copy of this document has been given to me. I agree to use ______________________________ Authorized Pharmacy located at _________________________________________________________________      Telephone ________________________________for filling ALL controlled medication prescriptions.     This agreement is entered into on 8/12/2021     Patient signature__________________________________________________________    Legal Guardian signature___________________________________________________    Provider signature_________________________________________________________    Witness signature_________________________________________________________
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
## 2021-08-12 NOTE — PROGRESS NOTES
Chief Complaint   Patient presents with    Medication Refill     1. Have you been to the ER, urgent care clinic since your last visit? Hospitalized since your last visit? No    2. Have you seen or consulted any other health care providers outside of the [address] [place] [address] since your last visit? Include any pap smears or colon screening. No    Call placed to pt. Verified patient with two type of identifiers.   requesting xanax refill

## 2021-09-15 ENCOUNTER — OFFICE VISIT (OUTPATIENT)
Dept: URGENT CARE | Age: 36
End: 2021-09-15
Payer: COMMERCIAL

## 2021-09-15 ENCOUNTER — VIRTUAL VISIT (OUTPATIENT)
Dept: FAMILY MEDICINE CLINIC | Age: 36
End: 2021-09-15

## 2021-09-15 VITALS — HEART RATE: 103 BPM | TEMPERATURE: 99.2 F | OXYGEN SATURATION: 97 % | RESPIRATION RATE: 16 BRPM

## 2021-09-15 DIAGNOSIS — Z20.822 SUSPECTED COVID-19 VIRUS INFECTION: Primary | ICD-10-CM

## 2021-09-15 DIAGNOSIS — Z20.822 ENCOUNTER BY TELEHEALTH FOR SUSPECTED COVID-19: ICD-10-CM

## 2021-09-15 DIAGNOSIS — Z71.89 ADVICE GIVEN ABOUT COVID-19 VIRUS INFECTION: ICD-10-CM

## 2021-09-15 DIAGNOSIS — J20.9 ACUTE BRONCHITIS, UNSPECIFIED ORGANISM: Primary | ICD-10-CM

## 2021-09-15 LAB — SARS-COV-2 POC: POSITIVE

## 2021-09-15 PROCEDURE — 87426 SARSCOV CORONAVIRUS AG IA: CPT | Performed by: FAMILY MEDICINE

## 2021-09-15 PROCEDURE — S9083 URGENT CARE CENTER GLOBAL: HCPCS | Performed by: FAMILY MEDICINE

## 2021-09-15 PROCEDURE — 99213 OFFICE O/P EST LOW 20 MIN: CPT | Performed by: FAMILY MEDICINE

## 2021-09-15 RX ORDER — METHYLPREDNISOLONE 4 MG/1
TABLET ORAL
Qty: 1 DOSE PACK | Refills: 0 | OUTPATIENT
Start: 2021-09-15 | End: 2022-01-01

## 2021-09-15 RX ORDER — ALBUTEROL SULFATE 90 UG/1
1 AEROSOL, METERED RESPIRATORY (INHALATION)
Qty: 1 EACH | Refills: 2 | OUTPATIENT
Start: 2021-09-15 | End: 2022-01-01

## 2021-09-15 RX ORDER — AZITHROMYCIN 250 MG/1
TABLET, FILM COATED ORAL
Qty: 6 TABLET | Refills: 0 | OUTPATIENT
Start: 2021-09-15 | End: 2022-01-01

## 2021-09-15 RX ORDER — ERGOCALCIFEROL 1.25 MG/1
50000 CAPSULE ORAL
Qty: 4 CAPSULE | Refills: 11 | OUTPATIENT
Start: 2021-09-15 | End: 2022-01-01

## 2021-09-15 RX ORDER — VITAMIN E 1000 UNIT
1000 CAPSULE ORAL DAILY
Qty: 30 TABLET | Refills: 0 | OUTPATIENT
Start: 2021-09-15 | End: 2022-01-01
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
## 2021-09-15 NOTE — LETTER
September 15, 2021  [facility]   [address] [facility] [address] [id number]    Dear Antonio Go: Thank you for requesting access to VIEO. Please follow the instructions below to securely access and download your online medical record. VIEO allows you to send messages to your doctor, view your test results, renew your prescriptions, schedule appointments, and more. How Do I Sign Up? 1. In your internet browser, go to [email or web address]. 2. Click on the First Time User? Click Here link in the Sign In box. You will see the New Member Sign Up page. 3. Enter your VIEO Access Code exactly as it appears below. You will not need to use this code after youve completed the sign-up process. If you do not sign up before the expiration date, you must request a new code. VIEO Access Code: [email or web address]  Expires: 10/24/2021  5:30 AM     4. Enter the last four digits of your Social Security Number (xxxx) and Date of Birth (mm/dd/yyyy) as indicated and click Submit. You will be taken to the next sign-up page. 5. Create a VIEO ID. This will be your VIEO login ID and cannot be changed, so think of one that is secure and easy to remember. 6. Create a VIEO password. You can change your password at any time. 7. Enter your Password Reset Question and Answer. This can be used at a later time if you forget your password. 8. Enter your e-mail address. You will receive e-mail notification when new information is available in [address]. 9. Click Sign Up. You can now view and download portions of your medical record. 10. Click the Download Summary menu link to download a portable copy of your medical information. Additional Information    If you have questions, please visit the Frequently Asked Questions section of the VIEO website at [email or web address] Remember, VIEO is NOT to be used for urgent needs. For medical emergencies, dial 911.     Now available from your iPhone and Android!     Sincerely,   [employer]

## 2021-09-15 NOTE — PROGRESS NOTES
No chief complaint on file. 1. Have you been to the ER, urgent care clinic since your last visit? Hospitalized since your last visit? No    2. Have you seen or consulted any other health care providers outside of the [address] [place] [address] since your last visit? Include any pap smears or colon screening. No    Call placed to pt. Verified patient with two type of identifiers.    C/o \"allergies\" , sneezing and congestion x
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
## 2021-10-04 ENCOUNTER — APPOINTMENT (OUTPATIENT)
Dept: ULTRASOUND IMAGING | Age: 36
End: 2021-10-04
Attending: EMERGENCY MEDICINE
Payer: COMMERCIAL

## 2021-10-04 ENCOUNTER — HOSPITAL ENCOUNTER (EMERGENCY)
Age: 36
Discharge: HOME | End: 2021-10-04
Attending: EMERGENCY MEDICINE
Payer: COMMERCIAL

## 2021-10-04 VITALS
BODY MASS INDEX: 33.95 KG/M2 | TEMPERATURE: 97.9 F | HEART RATE: 97 BPM | RESPIRATION RATE: 20 BRPM | OXYGEN SATURATION: 100 % | WEIGHT: 242.51 LBS | HEIGHT: 71 IN | SYSTOLIC BLOOD PRESSURE: 119 MMHG | DIASTOLIC BLOOD PRESSURE: 81 MMHG

## 2021-10-04 DIAGNOSIS — R10.9 ABDOMINAL PAIN IN PREGNANCY, SECOND TRIMESTER: Primary | ICD-10-CM

## 2021-10-04 DIAGNOSIS — O26.892 ABDOMINAL PAIN IN PREGNANCY, SECOND TRIMESTER: Primary | ICD-10-CM

## 2021-10-04 LAB
ALBUMIN SERPL-MCNC: 2.7 G/DL (ref 3.5–5)
ALBUMIN/GLOB SERPL: 0.7 {RATIO} (ref 1.1–2.2)
ALP SERPL-CCNC: 82 U/L (ref 45–117)
ALT SERPL-CCNC: 13 U/L (ref 12–78)
ANION GAP SERPL CALC-SCNC: 5 MMOL/L (ref 5–15)
APPEARANCE UR: CLEAR
AST SERPL-CCNC: 9 U/L (ref 15–37)
BACTERIA URNS QL MICRO: NEGATIVE /HPF
BASOPHILS # BLD: 0 K/UL (ref 0–0.1)
BASOPHILS NFR BLD: 0 % (ref 0–1)
BILIRUB SERPL-MCNC: 0.4 MG/DL (ref 0.2–1)
BILIRUB UR QL: NEGATIVE
BUN SERPL-MCNC: 5 MG/DL (ref 6–20)
BUN/CREAT SERPL: 10 (ref 12–20)
CALCIUM SERPL-MCNC: 8.7 MG/DL (ref 8.5–10.1)
CHLORIDE SERPL-SCNC: 109 MMOL/L (ref 97–108)
CO2 SERPL-SCNC: 23 MMOL/L (ref 21–32)
COLOR UR: NORMAL
COMMENT, HOLDF: NORMAL
CREAT SERPL-MCNC: 0.51 MG/DL (ref 0.55–1.02)
DIFFERENTIAL METHOD BLD: ABNORMAL
EOSINOPHIL # BLD: 0.1 K/UL (ref 0–0.4)
EOSINOPHIL NFR BLD: 1 % (ref 0–7)
EPITH CASTS URNS QL MICRO: NORMAL /LPF
ERYTHROCYTE [DISTWIDTH] IN BLOOD BY AUTOMATED COUNT: 12.6 % (ref 11.5–14.5)
GLOBULIN SER CALC-MCNC: 3.9 G/DL (ref 2–4)
GLUCOSE SERPL-MCNC: 80 MG/DL (ref 65–100)
GLUCOSE UR STRIP.AUTO-MCNC: NEGATIVE MG/DL
HCG SERPL-ACNC: ABNORMAL MIU/ML (ref 0–6)
HCT VFR BLD AUTO: 32.3 % (ref 35–47)
HGB BLD-MCNC: 11.1 G/DL (ref 11.5–16)
HGB UR QL STRIP: NEGATIVE
HYALINE CASTS URNS QL MICRO: NORMAL /LPF (ref 0–5)
IMM GRANULOCYTES # BLD AUTO: 0.1 K/UL (ref 0–0.04)
IMM GRANULOCYTES NFR BLD AUTO: 1 % (ref 0–0.5)
KETONES UR QL STRIP.AUTO: NEGATIVE MG/DL
LEUKOCYTE ESTERASE UR QL STRIP.AUTO: NEGATIVE
LYMPHOCYTES # BLD: 2.6 K/UL (ref 0.8–3.5)
LYMPHOCYTES NFR BLD: 23 % (ref 12–49)
MCH RBC QN AUTO: 30.2 PG (ref 26–34)
MCHC RBC AUTO-ENTMCNC: 34.4 G/DL (ref 30–36.5)
MCV RBC AUTO: 88 FL (ref 80–99)
MONOCYTES # BLD: 0.5 K/UL (ref 0–1)
MONOCYTES NFR BLD: 5 % (ref 5–13)
NEUTS SEG # BLD: 7.7 K/UL (ref 1.8–8)
NEUTS SEG NFR BLD: 70 % (ref 32–75)
NITRITE UR QL STRIP.AUTO: NEGATIVE
NRBC # BLD: 0 K/UL (ref 0–0.01)
NRBC BLD-RTO: 0 PER 100 WBC
PH UR STRIP: 7 [PH] (ref 5–8)
PLATELET # BLD AUTO: 293 K/UL (ref 150–400)
PMV BLD AUTO: 9.1 FL (ref 8.9–12.9)
POTASSIUM SERPL-SCNC: 3.5 MMOL/L (ref 3.5–5.1)
PROT SERPL-MCNC: 6.6 G/DL (ref 6.4–8.2)
PROT UR STRIP-MCNC: NEGATIVE MG/DL
RBC # BLD AUTO: 3.67 M/UL (ref 3.8–5.2)
RBC #/AREA URNS HPF: NORMAL /HPF (ref 0–5)
SAMPLES BEING HELD,HOLD: NORMAL
SODIUM SERPL-SCNC: 137 MMOL/L (ref 136–145)
SP GR UR REFRACTOMETRY: 1.01 (ref 1–1.03)
UR CULT HOLD, URHOLD: NORMAL
UROBILINOGEN UR QL STRIP.AUTO: 1 EU/DL (ref 0.2–1)
WBC # BLD AUTO: 11 K/UL (ref 3.6–11)
WBC URNS QL MICRO: NORMAL /HPF (ref 0–4)

## 2021-10-04 PROCEDURE — 85025 COMPLETE CBC W/AUTO DIFF WBC: CPT

## 2021-10-04 PROCEDURE — 84702 CHORIONIC GONADOTROPIN TEST: CPT

## 2021-10-04 PROCEDURE — 81001 URINALYSIS AUTO W/SCOPE: CPT

## 2021-10-04 PROCEDURE — 76815 OB US LIMITED FETUS(S): CPT

## 2021-10-04 PROCEDURE — 80053 COMPREHEN METABOLIC PANEL: CPT

## 2021-10-04 PROCEDURE — 36415 COLL VENOUS BLD VENIPUNCTURE: CPT

## 2021-10-04 PROCEDURE — 99283 EMERGENCY DEPT VISIT LOW MDM: CPT

## 2021-10-04 NOTE — ED NOTES
Patient discharged home by Ulysses Peels NP. Patient verbalized understanding of discharge instructions.

## 2021-10-04 NOTE — ED NOTES
Triage Note; Patient is coming in for abdominal pain since yesterday. Patient states having a positive pregnancy test 3 weeks ago. Patient had miscarriage in April and never had another periods since then so unaware how far along she is.
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
## 2021-10-05 ENCOUNTER — PATIENT OUTREACH (OUTPATIENT)
Dept: CASE MANAGEMENT | Age: 36
End: 2021-10-05

## 2021-10-05 NOTE — PROGRESS NOTES
10/5/2021  9:39 AM    Patient assigned to this ACM for [address] [facility] ED Visit on 10/4/21; diagnosis of abdominal pain in pregnancy, second trimester. No patient outreach/follow-up by this ACM at this time.

## 2022-01-01 ENCOUNTER — HOSPITAL ENCOUNTER (EMERGENCY)
Age: 37
Discharge: HOME | End: 2022-01-01
Payer: MEDICAID

## 2022-01-01 VITALS
BODY MASS INDEX: 34.02 KG/M2 | RESPIRATION RATE: 16 BRPM | HEART RATE: 81 BPM | DIASTOLIC BLOOD PRESSURE: 80 MMHG | WEIGHT: 243 LBS | TEMPERATURE: 98.3 F | SYSTOLIC BLOOD PRESSURE: 124 MMHG | HEIGHT: 71 IN

## 2022-01-01 DIAGNOSIS — Z3A.27 27 WEEKS GESTATION OF PREGNANCY: Primary | ICD-10-CM

## 2022-01-01 DIAGNOSIS — O26.853 SPOTTING AFFECTING PREGNANCY IN THIRD TRIMESTER: ICD-10-CM

## 2022-01-01 DIAGNOSIS — Z36.89 NST (NON-STRESS TEST) REACTIVE: ICD-10-CM

## 2022-01-01 PROCEDURE — 75810000275 HC EMERGENCY DEPT VISIT NO LEVEL OF CARE

## 2022-01-01 PROCEDURE — 99284 EMERGENCY DEPT VISIT MOD MDM: CPT

## 2022-01-01 NOTE — ED TRIAGE NOTES
1/1/2022  3:18 PM Patient arrived to [place] [address] accompanied by partner c/o new onset bright red vaginal bleeding and decreased fetal movement. Patient unsure if she has leaking of fluid, states when she coughs she has some fluid \"come out. \"     0607 EFM applied. [address] [place] [address] Tahir Platt at bedside to assess patient. Speculum exam performed, no blood visualized. Patient cleared for discharge, education and standard precautions reviewed and patient verbalized understanding.  Patient says she has a scheduled appointment this coming Monday with [facility].     1549 EFM adjusted, patient repositioned s/p exam.
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
## 2022-02-18 ENCOUNTER — HOSPITAL ENCOUNTER (EMERGENCY)
Age: 37
Discharge: HOME | End: 2022-02-18
Payer: MEDICAID

## 2022-02-18 VITALS — BODY MASS INDEX: 36.12 KG/M2 | WEIGHT: 258 LBS | HEIGHT: 71 IN | TEMPERATURE: 98 F | RESPIRATION RATE: 17 BRPM

## 2022-02-18 LAB
BASOPHILS # BLD: 0 K/UL (ref 0–0.1)
BASOPHILS NFR BLD: 0 % (ref 0–1)
DIFFERENTIAL METHOD BLD: ABNORMAL
EOSINOPHIL # BLD: 0.1 K/UL (ref 0–0.4)
EOSINOPHIL NFR BLD: 1 % (ref 0–7)
ERYTHROCYTE [DISTWIDTH] IN BLOOD BY AUTOMATED COUNT: 12.4 % (ref 11.5–14.5)
FETAL BLOOD VOL PATIENT KLEIH BETKE: NORMAL ML
HCT VFR BLD AUTO: 28.5 % (ref 35–47)
HGB BLD-MCNC: 10 G/DL (ref 11.5–16)
IMM GRANULOCYTES # BLD AUTO: 0.2 K/UL (ref 0–0.04)
IMM GRANULOCYTES NFR BLD AUTO: 1 % (ref 0–0.5)
LYMPHOCYTES # BLD: 2.8 K/UL (ref 0.8–3.5)
LYMPHOCYTES NFR BLD: 24 % (ref 12–49)
MCH RBC QN AUTO: 30.7 PG (ref 26–34)
MCHC RBC AUTO-ENTMCNC: 35.1 G/DL (ref 30–36.5)
MCV RBC AUTO: 87.4 FL (ref 80–99)
MONOCYTES # BLD: 0.7 K/UL (ref 0–1)
MONOCYTES NFR BLD: 6 % (ref 5–13)
NEUTS SEG # BLD: 7.9 K/UL (ref 1.8–8)
NEUTS SEG NFR BLD: 68 % (ref 32–75)
NRBC # BLD: 0 K/UL (ref 0–0.01)
NRBC BLD-RTO: 0 PER 100 WBC
PLATELET # BLD AUTO: 252 K/UL (ref 150–400)
PMV BLD AUTO: 8.9 FL (ref 8.9–12.9)
RBC # BLD AUTO: 3.26 M/UL (ref 3.8–5.2)
WBC # BLD AUTO: 11.7 K/UL (ref 3.6–11)

## 2022-02-18 PROCEDURE — 99284 EMERGENCY DEPT VISIT MOD MDM: CPT

## 2022-02-18 PROCEDURE — 59025 FETAL NON-STRESS TEST: CPT

## 2022-02-18 PROCEDURE — 74011250637 HC RX REV CODE- 250/637: Performed by: OBSTETRICS & GYNECOLOGY

## 2022-02-18 PROCEDURE — 85460 HEMOGLOBIN FETAL: CPT

## 2022-02-18 PROCEDURE — 85025 COMPLETE CBC W/AUTO DIFF WBC: CPT

## 2022-02-18 PROCEDURE — 75810000275 HC EMERGENCY DEPT VISIT NO LEVEL OF CARE

## 2022-02-18 RX ORDER — SODIUM CHLORIDE, SODIUM LACTATE, POTASSIUM CHLORIDE, CALCIUM CHLORIDE 600; 310; 30; 20 MG/100ML; MG/100ML; MG/100ML; MG/100ML
150 INJECTION, SOLUTION INTRAVENOUS CONTINUOUS
Status: DISCONTINUED | OUTPATIENT
Start: 2022-02-18 | End: 2022-02-18

## 2022-02-18 RX ORDER — ACETAMINOPHEN 325 MG/1
975 TABLET ORAL
Status: DISCONTINUED | OUTPATIENT
Start: 2022-02-18 | End: 2022-02-18 | Stop reason: HOSPADM

## 2022-02-18 RX ADMIN — ACETAMINOPHEN 975 MG: 325 TABLET ORAL at 18:39
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
## 2022-03-19 PROBLEM — E66.01 SEVERE OBESITY (HCC): Status: ACTIVE | Noted: 2020-03-16

## 2022-07-06 ENCOUNTER — APPOINTMENT (OUTPATIENT)
Dept: ULTRASOUND IMAGING | Age: 37
End: 2022-07-06
Attending: EMERGENCY MEDICINE
Payer: MEDICAID

## 2022-07-06 ENCOUNTER — HOSPITAL ENCOUNTER (EMERGENCY)
Age: 37
Discharge: HOME | End: 2022-07-06
Attending: EMERGENCY MEDICINE
Payer: MEDICAID

## 2022-07-06 VITALS
SYSTOLIC BLOOD PRESSURE: 116 MMHG | OXYGEN SATURATION: 98 % | BODY MASS INDEX: 34.02 KG/M2 | TEMPERATURE: 97.6 F | HEIGHT: 71 IN | WEIGHT: 243 LBS | RESPIRATION RATE: 18 BRPM | DIASTOLIC BLOOD PRESSURE: 79 MMHG | HEART RATE: 114 BPM

## 2022-07-06 DIAGNOSIS — O03.9 SPONTANEOUS MISCARRIAGE: Primary | ICD-10-CM

## 2022-07-06 LAB
ALBUMIN SERPL-MCNC: 3.5 G/DL (ref 3.5–5)
ALBUMIN/GLOB SERPL: 0.9 {RATIO} (ref 1.1–2.2)
ALP SERPL-CCNC: 101 U/L (ref 45–117)
ALT SERPL-CCNC: 16 U/L (ref 12–78)
ANION GAP SERPL CALC-SCNC: 5 MMOL/L (ref 5–15)
AST SERPL-CCNC: 9 U/L (ref 15–37)
BASOPHILS # BLD: 0 K/UL (ref 0–0.1)
BASOPHILS NFR BLD: 0 % (ref 0–1)
BILIRUB SERPL-MCNC: 0.4 MG/DL (ref 0.2–1)
BUN SERPL-MCNC: 10 MG/DL (ref 6–20)
BUN/CREAT SERPL: 13 (ref 12–20)
CALCIUM SERPL-MCNC: 9.3 MG/DL (ref 8.5–10.1)
CHLORIDE SERPL-SCNC: 106 MMOL/L (ref 97–108)
CO2 SERPL-SCNC: 25 MMOL/L (ref 21–32)
COMMENT, HOLDF: NORMAL
CREAT SERPL-MCNC: 0.8 MG/DL (ref 0.55–1.02)
DIFFERENTIAL METHOD BLD: ABNORMAL
EOSINOPHIL # BLD: 0.1 K/UL (ref 0–0.4)
EOSINOPHIL NFR BLD: 1 % (ref 0–7)
ERYTHROCYTE [DISTWIDTH] IN BLOOD BY AUTOMATED COUNT: 12.9 % (ref 11.5–14.5)
GLOBULIN SER CALC-MCNC: 3.8 G/DL (ref 2–4)
GLUCOSE SERPL-MCNC: 121 MG/DL (ref 65–100)
HCG SERPL-ACNC: ABNORMAL MIU/ML (ref 0–6)
HCG UR QL: POSITIVE
HCT VFR BLD AUTO: 35.1 % (ref 35–47)
HGB BLD-MCNC: 12.1 G/DL (ref 11.5–16)
IMM GRANULOCYTES # BLD AUTO: 0 K/UL (ref 0–0.04)
IMM GRANULOCYTES NFR BLD AUTO: 0 % (ref 0–0.5)
LYMPHOCYTES # BLD: 4.1 K/UL (ref 0.8–3.5)
LYMPHOCYTES NFR BLD: 35 % (ref 12–49)
MCH RBC QN AUTO: 28.7 PG (ref 26–34)
MCHC RBC AUTO-ENTMCNC: 34.5 G/DL (ref 30–36.5)
MCV RBC AUTO: 83.4 FL (ref 80–99)
MONOCYTES # BLD: 0.5 K/UL (ref 0–1)
MONOCYTES NFR BLD: 4 % (ref 5–13)
NEUTS SEG # BLD: 7 K/UL (ref 1.8–8)
NEUTS SEG NFR BLD: 60 % (ref 32–75)
NRBC # BLD: 0 K/UL (ref 0–0.01)
NRBC BLD-RTO: 0 PER 100 WBC
PLATELET # BLD AUTO: 343 K/UL (ref 150–400)
PMV BLD AUTO: 9.1 FL (ref 8.9–12.9)
POTASSIUM SERPL-SCNC: 3.2 MMOL/L (ref 3.5–5.1)
PROT SERPL-MCNC: 7.3 G/DL (ref 6.4–8.2)
RBC # BLD AUTO: 4.21 M/UL (ref 3.8–5.2)
SAMPLES BEING HELD,HOLD: NORMAL
SODIUM SERPL-SCNC: 136 MMOL/L (ref 136–145)
WBC # BLD AUTO: 11.7 K/UL (ref 3.6–11)

## 2022-07-06 PROCEDURE — 76801 OB US < 14 WKS SINGLE FETUS: CPT

## 2022-07-06 PROCEDURE — 36415 COLL VENOUS BLD VENIPUNCTURE: CPT

## 2022-07-06 PROCEDURE — 80053 COMPREHEN METABOLIC PANEL: CPT

## 2022-07-06 PROCEDURE — 74011250637 HC RX REV CODE- 250/637: Performed by: EMERGENCY MEDICINE

## 2022-07-06 PROCEDURE — 99284 EMERGENCY DEPT VISIT MOD MDM: CPT

## 2022-07-06 PROCEDURE — 74011250636 HC RX REV CODE- 250/636: Performed by: EMERGENCY MEDICINE

## 2022-07-06 PROCEDURE — 81025 URINE PREGNANCY TEST: CPT

## 2022-07-06 PROCEDURE — 76817 TRANSVAGINAL US OBSTETRIC: CPT

## 2022-07-06 PROCEDURE — 85025 COMPLETE CBC W/AUTO DIFF WBC: CPT

## 2022-07-06 PROCEDURE — 84702 CHORIONIC GONADOTROPIN TEST: CPT

## 2022-07-06 RX ORDER — ACETAMINOPHEN 500 MG
1000 TABLET ORAL ONCE
Status: COMPLETED | OUTPATIENT
Start: 2022-07-06 | End: 2022-07-06

## 2022-07-06 RX ADMIN — SODIUM CHLORIDE 1000 ML: 900 INJECTION, SOLUTION INTRAVENOUS at 03:40

## 2022-07-06 RX ADMIN — ACETAMINOPHEN 1000 MG: 500 TABLET ORAL at 05:00

## 2022-07-06 NOTE — ED TRIAGE NOTES
Patient presents ambulatory to triage with CC of passing blood clots \"and a small sack\" from her vagina. Bleeding began approximately 7 days ago. Patient suspected she was pregnant but had not seen GYN since April. Patient recently had a baby in March and has not had a period since then. Denies Fevers, N/V/D, urinary complaints.
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
## 2023-05-01 ENCOUNTER — NURSE TRIAGE (OUTPATIENT)
Dept: OTHER | Facility: CLINIC | Age: 38
End: 2023-05-01

## 2023-05-01 NOTE — TELEPHONE ENCOUNTER
Location of patient: [address] [place] from [place]  at [facility] with [employer]. Subjective: Caller states \"Allergies and shortness of breath\"     Current Symptoms: Nasal congestion, stuffy nose-when outside, has  nebulizer machine but only a few more albuterol treatment left,  get short of breath when outside. Hx of bronchitis. Not currently not short of breath    Onset: 1 week ago; intermittent    Associated Symptoms: NA    Pain Severity: Denies    Temperature: Denies    What has been tried: Albuterol-helping    LMP:  3 weeks ago  Pregnant: No    Recommended disposition: Go to [facility], caller requested going tomorrow instead, Reiterated my disposition to the patient. Care advice provided, patient verbalizes understanding; denies any other questions or concerns; instructed to call back for any new or worsening symptoms. Writer provided warm transfer to Leana Toor at [facility] for Refusal of Urgent Disposition    Attention Provider: Thank you for allowing me to participate in the care of your patient. The patient was connected to triage in response to information provided to the [facility]. Please do not respond through this encounter as the response is not directed to a shared pool.       Reason for Disposition   Longstanding difficulty breathing (e.g., CHF, COPD, emphysema) and worse than normal    Protocols used: Breathing Difficulty-ADULT-[place]

## 2023-05-02 ENCOUNTER — VIRTUAL VISIT (OUTPATIENT)
Dept: FAMILY MEDICINE CLINIC | Age: 38
End: 2023-05-02
Payer: MEDICAID

## 2023-05-02 DIAGNOSIS — Z20.822 ENCOUNTER BY TELEHEALTH FOR SUSPECTED COVID-19: ICD-10-CM

## 2023-05-02 DIAGNOSIS — J20.9 ACUTE BRONCHITIS, UNSPECIFIED ORGANISM: ICD-10-CM

## 2023-05-02 DIAGNOSIS — J45.41 MODERATE PERSISTENT ASTHMA WITH ACUTE EXACERBATION: ICD-10-CM

## 2023-05-02 DIAGNOSIS — R51.9 SINUS HEADACHE: Primary | ICD-10-CM

## 2023-05-02 DIAGNOSIS — Z71.89 ADVICE GIVEN ABOUT COVID-19 VIRUS INFECTION: ICD-10-CM

## 2023-05-02 PROCEDURE — 99213 OFFICE O/P EST LOW 20 MIN: CPT | Performed by: FAMILY MEDICINE

## 2023-05-02 RX ORDER — IPRATROPIUM BROMIDE AND ALBUTEROL SULFATE 2.5; .5 MG/3ML; MG/3ML
3 SOLUTION RESPIRATORY (INHALATION)
Qty: 30 EACH | Refills: 3 | Status: SHIPPED | OUTPATIENT
Start: 2023-05-02

## 2023-05-02 RX ORDER — ALBUTEROL SULFATE 90 UG/1
1 AEROSOL, METERED RESPIRATORY (INHALATION)
Qty: 1 EACH | Refills: 2 | Status: SHIPPED | OUTPATIENT
Start: 2023-05-02

## 2023-05-02 RX ORDER — METHYLPREDNISOLONE 4 MG/1
TABLET ORAL
Qty: 1 DOSE PACK | Refills: 0 | Status: SHIPPED | OUTPATIENT
Start: 2023-05-02

## 2023-05-02 RX ORDER — MINERAL OIL
180 ENEMA (ML) RECTAL DAILY
Qty: 30 TABLET | Refills: 5 | Status: SHIPPED | OUTPATIENT
Start: 2023-05-02

## 2023-05-02 RX ORDER — AMOXICILLIN AND CLAVULANATE POTASSIUM 875; 125 MG/1; MG/1
1 TABLET, FILM COATED ORAL EVERY 12 HOURS
Qty: 20 TABLET | Refills: 0 | Status: SHIPPED | OUTPATIENT
Start: 2023-05-02 | End: 2023-05-12

## 2023-05-08 ENCOUNTER — TELEPHONE (OUTPATIENT)
Age: 38
End: 2023-05-08

## 2023-05-11 ENCOUNTER — OFFICE VISIT (OUTPATIENT)
Age: 38
End: 2023-05-11
Payer: MEDICAID

## 2023-05-11 VITALS
WEIGHT: 252 LBS | OXYGEN SATURATION: 96 % | HEART RATE: 84 BPM | SYSTOLIC BLOOD PRESSURE: 123 MMHG | RESPIRATION RATE: 18 BRPM | DIASTOLIC BLOOD PRESSURE: 85 MMHG | TEMPERATURE: 98.4 F | BODY MASS INDEX: 35.15 KG/M2

## 2023-05-11 DIAGNOSIS — R22.2 LUMP OF SKIN OF BACK: Primary | ICD-10-CM

## 2023-05-11 DIAGNOSIS — N94.6 DYSMENORRHEA: ICD-10-CM

## 2023-05-11 LAB
HCG QUALITATIVE, POC: NORMAL
HCG, PREGNANCY, URINE, POC: NEGATIVE
VALID INTERNAL CONTROL, POC: NORMAL

## 2023-05-11 PROCEDURE — PBSHW AMB POC GONADOTROPIN, CHORIONIC (HCG); QUALITATIVE: Performed by: FAMILY MEDICINE

## 2023-05-11 PROCEDURE — 84703 CHORIONIC GONADOTROPIN ASSAY: CPT | Performed by: FAMILY MEDICINE

## 2023-05-11 PROCEDURE — 99213 OFFICE O/P EST LOW 20 MIN: CPT | Performed by: FAMILY MEDICINE

## 2023-05-11 RX ORDER — ALBUTEROL SULFATE 90 UG/1
1 AEROSOL, METERED RESPIRATORY (INHALATION) EVERY 4 HOURS PRN
COMMUNITY
Start: 2023-05-02

## 2023-05-11 RX ORDER — METHYLPREDNISOLONE 4 MG/1
TABLET ORAL
COMMUNITY
Start: 2023-05-02

## 2023-05-11 RX ORDER — FEXOFENADINE HCL 180 MG/1
180 TABLET ORAL DAILY
COMMUNITY
Start: 2023-05-02

## 2023-05-11 RX ORDER — NORGESTIMATE AND ETHINYL ESTRADIOL 7DAYSX3 28
1 KIT ORAL DAILY
Qty: 28 TABLET | Refills: 3 | Status: SHIPPED | OUTPATIENT
Start: 2023-05-11

## 2023-05-11 RX ORDER — IPRATROPIUM BROMIDE AND ALBUTEROL SULFATE 2.5; .5 MG/3ML; MG/3ML
3 SOLUTION RESPIRATORY (INHALATION) 4 TIMES DAILY PRN
COMMUNITY
Start: 2023-05-02

## 2023-05-11 ASSESSMENT — PATIENT HEALTH QUESTIONNAIRE - PHQ9
SUM OF ALL RESPONSES TO PHQ QUESTIONS 1-9: 0
SUM OF ALL RESPONSES TO PHQ QUESTIONS 1-9: 0
SUM OF ALL RESPONSES TO PHQ9 QUESTIONS 1 & 2: 0
SUM OF ALL RESPONSES TO PHQ QUESTIONS 1-9: 0
SUM OF ALL RESPONSES TO PHQ QUESTIONS 1-9: 0
2. FEELING DOWN, DEPRESSED OR HOPELESS: 0
1. LITTLE INTEREST OR PLEASURE IN DOING THINGS: 0

## 2023-05-11 NOTE — PROGRESS NOTES
Tosha Acevedo (:  1985) is a 40 y.o. female,Established patient, here for evaluation of the following chief complaint(s):  Follow-up (Allergies and bronchitis )    Present with a complaint of chronic mid sided upper back pain due to a lump on her mid back patient requesting the lump to be removed because it has been bothering her pain is 4 out of 10 nonradiating dull, in addition Pt with the complaint of her menses are coming on and off as they desire, has had no hot flashes, no vaginal dryness,pt is not depressed, has had no hx of hysterectomy,  but some + dyspareunia, no vaginal bleeding after intercourse, on no OTC and meds , LMP was irreg,         Constitutional: no chills and fever,nad     HENT: no ear pain and nosebleeds. No blurred vision,   Respiratory: no shortness of breath, wheezing cough nor sore throat. Cardiovascular: Has no chest pain, leg swelling ,and racing heart . Gastrointestinal: No constipation, diarrhea, nausea and vomiting. Genitourinary: No frequency. Musculoskeletal: +++for multiple joint pain. Skin: no itching, pimples   Neurological: Negative for dizziness, no tremors  Psychiatric/Behavioral: Negative for depression,  not nervous/anxious. General: Patient alert cooperative   HEENT; normocephalic and atraumatic     Neck: supple no adenopathy no JVD no bruit  Lungs: Clear to auscultation bilaterally no rales rhonchi or wheezes  Heart: Normal regular S1-S2 s no murmur  Breast exam deferred  Abdomen: Soft nontender normal bowel sounds    Extremities: abnormal range of motion ++ point tenderness normal pulses no edema,   Pelvic/: No lesion, no lymphadenopathy  Skin: abormal no lesion no rash           ASSESSMENT/PLAN:  1. Lump of skin of back  -     AFL - Paz Bales MD, Neurosurgery, [place] ([address] [facility])  2.  Dysmenorrhea  -     AMB POC GONADOTROPIN, CHORIONIC (HCG); QUALITATIVE  -     Norgestim-Eth Estrad Triphasic (TRI-SPRINTEC) 0.18/0.215/0.25 MG-35 MCG

## 2023-05-11 NOTE — PROGRESS NOTES
Chief Complaint   Patient presents with    Follow-up     Allergies and bronchitis      Vitals:    23 1016   BP: 123/85   Pulse: 84   Resp: 18   Temp: 98.4 °F (36.9 °C)   TempSrc: Oral   SpO2: 96%   Weight: 252 lb (114.3 kg)      Results for orders placed or performed in visit on 23   AMB POC GONADOTROPIN, CHORIONIC (HCG); QUALITATIVE   Result Value Ref Range    Valid Internal Control, POC y     HCG, Pregnancy, Urine, POC Negative Negative    HCG Qualitative, POC         1. Have you been to the ER, urgent care clinic since your last visit? Hospitalized since your last visit? No    2. Have you seen or consulted any other health care providers outside of the [address] [place] [address] since your last visit? Include any pap smears or colon screening. No    The patient, Naomie Muñoz, identity was verified by MRN and Name and .

## 2023-05-13 RX ORDER — METHYLPREDNISOLONE 4 MG/1
TABLET ORAL
COMMUNITY
Start: 2023-05-02

## 2023-08-02 DIAGNOSIS — N94.6 DYSMENORRHEA: ICD-10-CM

## 2023-08-02 RX ORDER — NORGESTIMATE AND ETHINYL ESTRADIOL 7DAYSX3 28
KIT ORAL
Qty: 84 TABLET | Refills: 1 | Status: SHIPPED | OUTPATIENT
Start: 2023-08-02

## 2023-11-08 ENCOUNTER — OFFICE VISIT (OUTPATIENT)
Age: 38
End: 2023-11-08
Payer: MEDICAID

## 2023-11-08 VITALS
HEIGHT: 71 IN | OXYGEN SATURATION: 96 % | HEART RATE: 89 BPM | DIASTOLIC BLOOD PRESSURE: 83 MMHG | WEIGHT: 261 LBS | TEMPERATURE: 98.2 F | SYSTOLIC BLOOD PRESSURE: 120 MMHG | BODY MASS INDEX: 36.54 KG/M2 | RESPIRATION RATE: 20 BRPM

## 2023-11-08 DIAGNOSIS — J45.41 MODERATE PERSISTENT ASTHMA WITH (ACUTE) EXACERBATION: Primary | ICD-10-CM

## 2023-11-08 DIAGNOSIS — Z02.89 ENCOUNTER FOR COMPLETION OF FORM WITH PATIENT: ICD-10-CM

## 2023-11-08 PROBLEM — B95.1 POSITIVE GBS TEST: Status: ACTIVE | Noted: 2022-03-04

## 2023-11-08 PROBLEM — O34.219 HISTORY OF CESAREAN DELIVERY AFFECTING PREGNANCY: Status: ACTIVE | Noted: 2022-03-02

## 2023-11-08 PROBLEM — O41.03X0 OLIGOHYDRAMNIOS IN THIRD TRIMESTER: Status: ACTIVE | Noted: 2022-03-20

## 2023-11-08 PROCEDURE — 99214 OFFICE O/P EST MOD 30 MIN: CPT | Performed by: FAMILY MEDICINE

## 2023-11-08 RX ORDER — ALBUTEROL SULFATE 90 UG/1
1 AEROSOL, METERED RESPIRATORY (INHALATION) EVERY 4 HOURS PRN
Qty: 18 G | Refills: 4 | Status: SHIPPED | OUTPATIENT
Start: 2023-11-08

## 2023-11-08 RX ORDER — IPRATROPIUM BROMIDE AND ALBUTEROL SULFATE 2.5; .5 MG/3ML; MG/3ML
3 SOLUTION RESPIRATORY (INHALATION) 4 TIMES DAILY PRN
Qty: 360 ML | Refills: 5
Start: 2023-11-08

## 2023-11-08 SDOH — ECONOMIC STABILITY: INCOME INSECURITY: HOW HARD IS IT FOR YOU TO PAY FOR THE VERY BASICS LIKE FOOD, HOUSING, MEDICAL CARE, AND HEATING?: NOT HARD AT ALL

## 2023-11-08 SDOH — ECONOMIC STABILITY: HOUSING INSECURITY
IN THE LAST 12 MONTHS, WAS THERE A TIME WHEN YOU DID NOT HAVE A STEADY PLACE TO SLEEP OR SLEPT IN A SHELTER (INCLUDING NOW)?: NO

## 2023-11-08 SDOH — ECONOMIC STABILITY: FOOD INSECURITY: WITHIN THE PAST 12 MONTHS, THE FOOD YOU BOUGHT JUST DIDN'T LAST AND YOU DIDN'T HAVE MONEY TO GET MORE.: NEVER TRUE

## 2023-11-08 SDOH — ECONOMIC STABILITY: FOOD INSECURITY: WITHIN THE PAST 12 MONTHS, YOU WORRIED THAT YOUR FOOD WOULD RUN OUT BEFORE YOU GOT MONEY TO BUY MORE.: NEVER TRUE

## 2023-11-08 ASSESSMENT — PATIENT HEALTH QUESTIONNAIRE - PHQ9
8. MOVING OR SPEAKING SO SLOWLY THAT OTHER PEOPLE COULD HAVE NOTICED. OR THE OPPOSITE, BEING SO FIGETY OR RESTLESS THAT YOU HAVE BEEN MOVING AROUND A LOT MORE THAN USUAL: 0
3. TROUBLE FALLING OR STAYING ASLEEP: 1
1. LITTLE INTEREST OR PLEASURE IN DOING THINGS: 0
5. POOR APPETITE OR OVEREATING: 1
6. FEELING BAD ABOUT YOURSELF - OR THAT YOU ARE A FAILURE OR HAVE LET YOURSELF OR YOUR FAMILY DOWN: 0
7. TROUBLE CONCENTRATING ON THINGS, SUCH AS READING THE NEWSPAPER OR WATCHING TELEVISION: 1
4. FEELING TIRED OR HAVING LITTLE ENERGY: 1
2. FEELING DOWN, DEPRESSED OR HOPELESS: 1
SUM OF ALL RESPONSES TO PHQ9 QUESTIONS 1 & 2: 1
9. THOUGHTS THAT YOU WOULD BE BETTER OFF DEAD, OR OF HURTING YOURSELF: 0
SUM OF ALL RESPONSES TO PHQ QUESTIONS 1-9: 5
SUM OF ALL RESPONSES TO PHQ QUESTIONS 1-9: 5
10. IF YOU CHECKED OFF ANY PROBLEMS, HOW DIFFICULT HAVE THESE PROBLEMS MADE IT FOR YOU TO DO YOUR WORK, TAKE CARE OF THINGS AT HOME, OR GET ALONG WITH OTHER PEOPLE: 0
SUM OF ALL RESPONSES TO PHQ QUESTIONS 1-9: 5
SUM OF ALL RESPONSES TO PHQ QUESTIONS 1-9: 5

## 2023-11-14 NOTE — PROGRESS NOTES
Dominga Arguelles (:  1985) is a 41 y.o. female,Established patient, here for evaluation of the following chief complaint(s):  Follow-up (Patient needs forms completed)    Patient present with the history of chronic asthmatic disease for many many years patient currently on Nebulizer therapy at home which has been helping her greatly currently present with a form for serious medical condition certification form from [address] [place] [address] which needs authorization regarding her continuous use of  Nebulizer treatment few times daily at home for which the pt is in serious demand for a better outcome otherwise patient has no other complaint in today's visit      Constitutional: no chills and fever,  , nad     HENT: no ear pain or nosebleeds. No blurred vision  Respiratory: no shortness of breath, wheezing cough   Cardiovascular: Has no chest pain, ,and racing heart . Gastrointestinal: No constipation, diarrhea, nausea and vomiting. Genitourinary: No frequency. Musculoskeletal: Negative for joint pain. Skin: no itching, no rash. Neurological: Negative for dizziness, no tremors  Psychiatric/Behavioral: Negative for depression, is not nervous/anxious. General:  alert cooperative appears stated for the age  HEENT; normocephalic and atraumatic PERRLA extraocular motor intact normal tympanic membrane normal external ear bilaterally, mucosal normal no drainage  Neck: supple no adenopathy no JVD no bruit  Lungs: Clear to auscultation bilaterally no rales rhonchi or wheezes  Heart: Normal regular S1-S2 ,  no murmur  Breast exam deferred  Abdomen: Soft nontender normal bowel sounds   Extremities: Normal range of motion no point tenderness normal pulses no edema  Pelvic/: No lesion, no lymphadenopathy  Skin: Normal no lesion no rash         ASSESSMENT/PLAN:  1.  Moderate persistent asthma with (acute) exacerbation  -     ipratropium 0.5 mg-albuterol 2.5 mg (DUONEB) 0.5-2.5 (3) MG/3ML SOLN nebulizer solution;

## 2024-02-15 ENCOUNTER — TELEMEDICINE (OUTPATIENT)
Age: 39
End: 2024-02-15
Payer: MEDICAID

## 2024-02-15 DIAGNOSIS — F33.9 RECURRENT DEPRESSIVE DISORDER (HCC): Primary | ICD-10-CM

## 2024-02-15 DIAGNOSIS — F41.1 GAD (GENERALIZED ANXIETY DISORDER): ICD-10-CM

## 2024-02-15 DIAGNOSIS — Z02.89 ENCOUNTER TO OBTAIN EXCUSE FROM WORK: ICD-10-CM

## 2024-02-15 DIAGNOSIS — F43.21 COMPLICATED GRIEF: ICD-10-CM

## 2024-02-15 PROCEDURE — 99214 OFFICE O/P EST MOD 30 MIN: CPT | Performed by: FAMILY MEDICINE

## 2024-02-15 RX ORDER — BUPROPION HYDROCHLORIDE 150 MG/1
150 TABLET ORAL EVERY MORNING
Qty: 30 TABLET | Refills: 3 | Status: SHIPPED | OUTPATIENT
Start: 2024-02-15

## 2024-02-15 NOTE — PROGRESS NOTES
Deanna Melchor, was evaluated through a synchronous (real-time) audio-video encounter. The patient (or guardian if applicable) is aware that this is a billable service, which includes applicable co-pays. This Virtual Visit was conducted with patient's (and/or legal guardian's) consent. Patient identification was verified, and a caregiver was present when appropriate.   The patient was located at Home: [address] [place] [phone]  Provider was located at Facility (Appt Dept): [address] [place] [phone]      Deanna Melchor (:  1985) is a Established patient, presenting virtually for evaluation of the following:    States that she sees Psycho tx, and was diagnosed with ptsd and LAURO, was told to need xanax, but higher dosages, last time taken Xanax was 2 yrs ago, made her calmer,   Feeling anxious and depressed no cannabis, laoss of family meber brother , missed work from  to , needs a work note,   Patient state that things are not getting better: pt states that unable to sleep, , unable to concentrate at work and at home at this time, +feeling anxius, no guilty feeling, has no nl interest to do things, feeling depressed, and is not related to social or performance issues, In addition pt states that there is no etoh or illicit drug use, not the first time,  no hx of physical nor of mental abuse, and currently is feeling safe in general.        Constitutional: no chills and fever,  , nad     HENT: no ear pain or nosebleeds. No blurred vision  Respiratory: no shortness of breath, wheezing cough   Cardiovascular: Has no chest pain, ,and racing heart .   Gastrointestinal: No constipation, diarrhea, nausea and vomiting.   Genitourinary: No frequency.   Musculoskeletal: Negative for joint pain.   Skin: no itching, no rash.   Neurological: Negative for dizziness, no tremors  Psychiatric/Behavioral: +++ for depression  ++nervous/anxious.         Assessment

## 2024-03-11 ENCOUNTER — OFFICE VISIT (OUTPATIENT)
Age: 39
End: 2024-03-11

## 2024-03-11 VITALS
HEART RATE: 87 BPM | TEMPERATURE: 98.7 F | SYSTOLIC BLOOD PRESSURE: 129 MMHG | OXYGEN SATURATION: 97 % | WEIGHT: 263.67 LBS | DIASTOLIC BLOOD PRESSURE: 86 MMHG | RESPIRATION RATE: 19 BRPM | BODY MASS INDEX: 36.77 KG/M2

## 2024-03-11 DIAGNOSIS — J02.9 SORE THROAT: Primary | ICD-10-CM

## 2024-03-11 DIAGNOSIS — Z20.818 EXPOSURE TO STREP THROAT: ICD-10-CM

## 2024-03-11 DIAGNOSIS — J06.9 VIRAL UPPER RESPIRATORY TRACT INFECTION: ICD-10-CM

## 2024-03-11 LAB
GROUP A STREP ANTIGEN, POC: NEGATIVE
Lab: NORMAL
PERFORMING INSTRUMENT: NORMAL
QC PASS/FAIL: NORMAL
QUICKVUE INFLUENZA TEST: NEGATIVE
SARS-COV-2, POC: NORMAL
VALID INTERNAL CONTROL, POC: NORMAL
VALID INTERNAL CONTROL, POC: YES

## 2024-03-11 RX ORDER — AMOXICILLIN 875 MG/1
875 TABLET, COATED ORAL 2 TIMES DAILY
Qty: 14 TABLET | Refills: 0 | Status: SHIPPED | OUTPATIENT
Start: 2024-03-11 | End: 2024-03-18

## 2024-03-11 ASSESSMENT — ENCOUNTER SYMPTOMS
SINUS PAIN: 1
SORE THROAT: 1
CHEST TIGHTNESS: 0
RHINORRHEA: 1
COUGH: 1
SWOLLEN GLANDS: 0
NAUSEA: 1
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
## 2024-04-26 ENCOUNTER — OFFICE VISIT (OUTPATIENT)
Age: 39
End: 2024-04-26

## 2024-04-26 VITALS
OXYGEN SATURATION: 97 % | BODY MASS INDEX: 36.54 KG/M2 | SYSTOLIC BLOOD PRESSURE: 150 MMHG | DIASTOLIC BLOOD PRESSURE: 94 MMHG | RESPIRATION RATE: 18 BRPM | TEMPERATURE: 98.5 F | WEIGHT: 262 LBS | HEART RATE: 93 BPM

## 2024-04-26 DIAGNOSIS — N39.0 URINARY TRACT INFECTION WITHOUT HEMATURIA, SITE UNSPECIFIED: Primary | ICD-10-CM

## 2024-04-26 DIAGNOSIS — Z3A.01 LESS THAN 8 WEEKS GESTATION OF PREGNANCY: ICD-10-CM

## 2024-04-26 LAB
BILIRUBIN, URINE, POC: NEGATIVE
BLOOD URINE, POC: ABNORMAL
GLUCOSE URINE, POC: NEGATIVE
HCG, PREGNANCY, URINE, POC: POSITIVE
KETONES, URINE, POC: NEGATIVE
LEUKOCYTE ESTERASE, URINE, POC: ABNORMAL
NITRITE, URINE, POC: NEGATIVE
PH, URINE, POC: 6.5 (ref 4.6–8)
PROTEIN,URINE, POC: NEGATIVE
SPECIFIC GRAVITY, URINE, POC: 1.02 (ref 1–1.03)
URINALYSIS CLARITY, POC: ABNORMAL
URINALYSIS COLOR, POC: ABNORMAL
UROBILINOGEN, POC: ABNORMAL
VALID INTERNAL CONTROL, POC: YES

## 2024-04-26 RX ORDER — CEPHALEXIN 500 MG/1
500 CAPSULE ORAL 2 TIMES DAILY
Qty: 14 CAPSULE | Refills: 0 | Status: SHIPPED | OUTPATIENT
Start: 2024-04-26 | End: 2024-05-03

## 2024-04-26 RX ORDER — AMOXICILLIN 875 MG/1
TABLET, COATED ORAL
COMMUNITY
Start: 2024-04-25

## 2024-04-26 NOTE — PROGRESS NOTES
Deanna Melchor (:  1985) is a 38 y.o. female,Established patient, here for evaluation of the following chief complaint(s):  Urinary Tract Infection (C/o pain when voiding and cramping on right side. Sx 3 days.)      Assessment & Plan :  Visit Diagnoses and Associated Orders       Urinary tract infection without hematuria, site unspecified    -  Primary    AMB POC URINALYSIS DIP STICK AUTO W/O MICRO [56408 CPT(R)]      AMB POC URINE PREGNANCY TEST, VISUAL COLOR COMPARISON [30272 CPT(R)]      Urine culture (clean catch) [19118 Custom]      cephALEXin (KEFLEX) 500 MG capsule [6260]           Less than 8 weeks gestation of pregnancy             ORDERS WITHOUT AN ASSOCIATED DIAGNOSIS    amoxicillin (AMOXIL) 875 MG tablet [27881]              Presentation and testing today is consistent with urinary tract infection  No fevers/chills or flank pain to suggest ascending infection or complications at this time, no abdominal tenderness/pain to suggest acute pathology  Your pregnancy test today was positive  Will treat with Keflex as directed  Urine culture sent for confirmation, will contact you in 2-3 days with results  Continue to drink lots of fluids  Follow up with OBGYN for discussion of your new pregnancy  Go to the ED with any severe abdominal pain, fevers/chills, back/flank pain, or if vomiting and unable to tolerate fluids/medication       Subjective :    Urinary Tract Infection         38 y.o. female presents with symptoms of dysuria, increased urinary frequency, feelings of urinary urgency for the past 3 days. Reports some mild cramping in right lower abdomen which she feels when urinating. Denies any abdominal tenderness. Denies fevers or chills. No body aches or fatigue. Denies vaginal discharge or odor. Unsure about pregnancy status, missed period this month, LMP was beginning of last month. Denies concern for STIs.          Vitals:    24 1910   BP: (!) 150/94   Site: Left Upper Arm   Position:

## 2024-04-27 LAB
BACTERIA SPEC CULT: NORMAL
CC UR VC: NORMAL
SERVICE CMNT-IMP: NORMAL

## 2024-04-27 NOTE — PATIENT INSTRUCTIONS
Presentation and testing today is consistent with urinary tract infection  No fevers/chills or flank pain to suggest ascending infection or complications at this time  Your pregnancy test today was positive  Will treat with Keflex as directed  Urine culture sent for confirmation, will contact you in 2-3 days with results  Continue to drink lots of fluids  Follow up with OBGYN for discussion of your new pregnancy  Go to the ED with any severe abdominal pain, fevers/chills, back/flank pain, or if vomiting and unable to tolerate fluids/medication

## 2024-04-28 ENCOUNTER — TELEPHONE (OUTPATIENT)
Age: 39
End: 2024-04-28

## 2024-04-30 ENCOUNTER — APPOINTMENT (OUTPATIENT)
Facility: HOSPITAL | Age: 39
End: 2024-04-30
Payer: MEDICAID

## 2024-04-30 ENCOUNTER — HOSPITAL ENCOUNTER (EMERGENCY)
Facility: HOSPITAL | Age: 39
Discharge: HOME | End: 2024-04-30
Attending: EMERGENCY MEDICINE
Payer: MEDICAID

## 2024-04-30 VITALS
TEMPERATURE: 98.2 F | BODY MASS INDEX: 35.99 KG/M2 | SYSTOLIC BLOOD PRESSURE: 128 MMHG | HEART RATE: 90 BPM | DIASTOLIC BLOOD PRESSURE: 77 MMHG | WEIGHT: 257.06 LBS | RESPIRATION RATE: 18 BRPM | OXYGEN SATURATION: 95 % | HEIGHT: 71 IN

## 2024-04-30 DIAGNOSIS — O26.891 ABDOMINAL PAIN DURING PREGNANCY IN FIRST TRIMESTER: Primary | ICD-10-CM

## 2024-04-30 DIAGNOSIS — R10.9 ABDOMINAL PAIN DURING PREGNANCY IN FIRST TRIMESTER: Primary | ICD-10-CM

## 2024-04-30 DIAGNOSIS — E87.6 HYPOKALEMIA: ICD-10-CM

## 2024-04-30 LAB
ALBUMIN SERPL-MCNC: 2.9 G/DL (ref 3.5–5)
ALBUMIN/GLOB SERPL: 0.7 (ref 1.1–2.2)
ALP SERPL-CCNC: 105 U/L (ref 45–117)
ALT SERPL-CCNC: 11 U/L (ref 12–78)
ANION GAP SERPL CALC-SCNC: 7 MMOL/L (ref 5–15)
APPEARANCE UR: CLEAR
AST SERPL-CCNC: 8 U/L (ref 15–37)
BACTERIA URNS QL MICRO: ABNORMAL /HPF
BASOPHILS # BLD: 0 K/UL (ref 0–0.1)
BASOPHILS NFR BLD: 0 % (ref 0–1)
BILIRUB SERPL-MCNC: 0.4 MG/DL (ref 0.2–1)
BILIRUB UR QL: NEGATIVE
BUN SERPL-MCNC: 5 MG/DL (ref 6–20)
BUN/CREAT SERPL: 8 (ref 12–20)
CALCIUM SERPL-MCNC: 8.8 MG/DL (ref 8.5–10.1)
CHLORIDE SERPL-SCNC: 105 MMOL/L (ref 97–108)
CO2 SERPL-SCNC: 23 MMOL/L (ref 21–32)
COLOR UR: ABNORMAL
COMMENT:: NORMAL
CREAT SERPL-MCNC: 0.63 MG/DL (ref 0.55–1.02)
CRL: 6.94 CM
CRL: 6.96 CM
CRL: 6.99 CM
DIFFERENTIAL METHOD BLD: ABNORMAL
EOSINOPHIL # BLD: 0.1 K/UL (ref 0–0.4)
EOSINOPHIL NFR BLD: 1 % (ref 0–7)
EPITH CASTS URNS QL MICRO: ABNORMAL /LPF
ERYTHROCYTE [DISTWIDTH] IN BLOOD BY AUTOMATED COUNT: 14.4 % (ref 11.5–14.5)
GLOBULIN SER CALC-MCNC: 4.1 G/DL (ref 2–4)
GLUCOSE SERPL-MCNC: 107 MG/DL (ref 65–100)
GLUCOSE UR STRIP.AUTO-MCNC: NEGATIVE MG/DL
HCG SERPL-ACNC: ABNORMAL MIU/ML (ref 0–6)
HCT VFR BLD AUTO: 34.3 % (ref 35–47)
HGB BLD-MCNC: 12 G/DL (ref 11.5–16)
HGB UR QL STRIP: NEGATIVE
HYALINE CASTS URNS QL MICRO: ABNORMAL /LPF (ref 0–5)
IMM GRANULOCYTES # BLD AUTO: 0 K/UL (ref 0–0.04)
IMM GRANULOCYTES NFR BLD AUTO: 0 % (ref 0–0.5)
KETONES UR QL STRIP.AUTO: NEGATIVE MG/DL
LEUKOCYTE ESTERASE UR QL STRIP.AUTO: ABNORMAL
LIPASE SERPL-CCNC: 21 U/L (ref 13–75)
LYMPHOCYTES # BLD: 3 K/UL (ref 0.8–3.5)
LYMPHOCYTES NFR BLD: 27 % (ref 12–49)
MCH RBC QN AUTO: 28.9 PG (ref 26–34)
MCHC RBC AUTO-ENTMCNC: 35 G/DL (ref 30–36.5)
MCV RBC AUTO: 82.7 FL (ref 80–99)
MONOCYTES # BLD: 0.6 K/UL (ref 0–1)
MONOCYTES NFR BLD: 5 % (ref 5–13)
NEUTS SEG # BLD: 7.5 K/UL (ref 1.8–8)
NEUTS SEG NFR BLD: 67 % (ref 32–75)
NITRITE UR QL STRIP.AUTO: NEGATIVE
NRBC # BLD: 0 K/UL (ref 0–0.01)
NRBC BLD-RTO: 0 PER 100 WBC
PH UR STRIP: 6.5 (ref 5–8)
PLATELET # BLD AUTO: 342 K/UL (ref 150–400)
PMV BLD AUTO: 9.2 FL (ref 8.9–12.9)
POTASSIUM SERPL-SCNC: 3.1 MMOL/L (ref 3.5–5.1)
PROT SERPL-MCNC: 7 G/DL (ref 6.4–8.2)
PROT UR STRIP-MCNC: NEGATIVE MG/DL
RBC # BLD AUTO: 4.15 M/UL (ref 3.8–5.2)
RBC #/AREA URNS HPF: ABNORMAL /HPF (ref 0–5)
SAC DIAMETER: 0 CM
SODIUM SERPL-SCNC: 135 MMOL/L (ref 136–145)
SP GR UR REFRACTOMETRY: 1.01 (ref 1–1.03)
SPECIMEN HOLD: NORMAL
SPECIMEN HOLD: NORMAL
UROBILINOGEN UR QL STRIP.AUTO: 1 EU/DL (ref 0.2–1)
WBC # BLD AUTO: 11.3 K/UL (ref 3.6–11)
WBC URNS QL MICRO: ABNORMAL /HPF (ref 0–4)

## 2024-04-30 PROCEDURE — 81001 URINALYSIS AUTO W/SCOPE: CPT

## 2024-04-30 PROCEDURE — 76801 OB US < 14 WKS SINGLE FETUS: CPT

## 2024-04-30 PROCEDURE — 99284 EMERGENCY DEPT VISIT MOD MDM: CPT

## 2024-04-30 PROCEDURE — 36415 COLL VENOUS BLD VENIPUNCTURE: CPT

## 2024-04-30 PROCEDURE — 6370000000 HC RX 637 (ALT 250 FOR IP)

## 2024-04-30 PROCEDURE — 83690 ASSAY OF LIPASE: CPT

## 2024-04-30 PROCEDURE — 80053 COMPREHEN METABOLIC PANEL: CPT

## 2024-04-30 PROCEDURE — 85025 COMPLETE CBC W/AUTO DIFF WBC: CPT

## 2024-04-30 PROCEDURE — 84702 CHORIONIC GONADOTROPIN TEST: CPT

## 2024-04-30 RX ORDER — POTASSIUM CHLORIDE 750 MG/1
40 TABLET, FILM COATED, EXTENDED RELEASE ORAL ONCE
Status: COMPLETED | OUTPATIENT
Start: 2024-04-30 | End: 2024-04-30

## 2024-04-30 RX ADMIN — POTASSIUM CHLORIDE 40 MEQ: 750 TABLET, FILM COATED, EXTENDED RELEASE ORAL at 18:58

## 2024-04-30 ASSESSMENT — ENCOUNTER SYMPTOMS
DIARRHEA: 0
SHORTNESS OF BREATH: 0
NAUSEA: 0
ABDOMINAL PAIN: 1
VOMITING: 0

## 2024-04-30 ASSESSMENT — PAIN SCALES - GENERAL: PAINLEVEL_OUTOF10: 8

## 2024-04-30 ASSESSMENT — PAIN - FUNCTIONAL ASSESSMENT: PAIN_FUNCTIONAL_ASSESSMENT: PREVENTS OR INTERFERES WITH MANY ACTIVE NOT PASSIVE ACTIVITIES

## 2024-04-30 ASSESSMENT — PAIN DESCRIPTION - ORIENTATION: ORIENTATION: LOWER;RIGHT;LEFT

## 2024-04-30 ASSESSMENT — PAIN DESCRIPTION - PAIN TYPE: TYPE: ACUTE PAIN

## 2024-04-30 ASSESSMENT — PAIN DESCRIPTION - LOCATION: LOCATION: ABDOMEN

## 2024-04-30 ASSESSMENT — PAIN DESCRIPTION - FREQUENCY: FREQUENCY: CONTINUOUS

## 2024-04-30 ASSESSMENT — PAIN DESCRIPTION - DESCRIPTORS: DESCRIPTORS: CRAMPING

## 2024-04-30 ASSESSMENT — PAIN DESCRIPTION - ONSET: ONSET: ON-GOING

## 2024-04-30 NOTE — ED TRIAGE NOTES
TRIAGE: Pt arrives with c/o abdominal cramping that started last week. Pt was seen at UC and dx with bladder infx. Pt was referred here if symptoms didn't resolve with use of cephalexin. Denies vaginal bleeding.     Pt is 12 weeks pregnant.

## 2024-04-30 NOTE — DISCHARGE INSTRUCTIONS
Take tylenol as needed for pain.     Continue the Keflex.     Follow-up closely with your OBGYN. Call for appointment as soon as possible.

## 2024-04-30 NOTE — ED PROVIDER NOTES
[facility] EMERGENCY DEP  EMERGENCY DEPARTMENT ENCOUNTER      Pt Name: Deanna Melchro  MRN: [id number]  Birthdate 1985  Date of evaluation: 4/30/2024  Provider: TIAGO Myers NP    CHIEF COMPLAINT       Chief Complaint   Patient presents with    Abdominal Cramping         HISTORY OF PRESENT ILLNESS   (Location/Symptom, Timing/Onset, Context/Setting, Quality, Duration, Modifying Factors, Severity)  Note limiting factors.   Deanna Melchor is a 38 y.o. female presenting to the ED c/o abdominal cramping since last week. Pt reports she went to  on Saturday and was told she had acute cystits. Pt was given a course of cephalexin w/o much improvement. Pt reports going back to the  today given worsening symptoms and was told to come to ER. Pt reports initial pain was after urination but now having persistent pains. Pt reports being 12 weeks pregnant. Pt reports has appointment with her OBGYN on May 31st. Pt reports taking tylenol a few days ago that seem to worsen symptoms.     Denies vaginal bleeding, hematuria, nausea, vomiting, diarrhea, fevers, seeing OBGYN.     Social hx: admits to smoking, etoh, drug, marijuana.     The history is provided by the patient. No  was used.         Review of External Medical Records:     Nursing Notes were reviewed.    REVIEW OF SYSTEMS    (2-9 systems for level 4, 10 or more for level 5)     Review of Systems   Constitutional:  Negative for activity change, appetite change, chills and fever.   Respiratory:  Negative for shortness of breath.    Cardiovascular:  Negative for chest pain.   Gastrointestinal:  Positive for abdominal pain. Negative for diarrhea, nausea and vomiting.   Genitourinary:  Negative for decreased urine volume, difficulty urinating, dysuria, flank pain, vaginal bleeding and vaginal discharge.   Musculoskeletal:  Negative for myalgias.   Skin:  Negative for rash.   All other systems reviewed and are negative.      Except as noted above

## 2024-05-15 ENCOUNTER — OFFICE VISIT (OUTPATIENT)
Age: 39
End: 2024-05-15

## 2024-05-15 VITALS
OXYGEN SATURATION: 98 % | TEMPERATURE: 98.7 F | HEART RATE: 82 BPM | DIASTOLIC BLOOD PRESSURE: 82 MMHG | WEIGHT: 257 LBS | BODY MASS INDEX: 35.84 KG/M2 | SYSTOLIC BLOOD PRESSURE: 123 MMHG

## 2024-05-15 DIAGNOSIS — G43.819 OTHER MIGRAINE WITHOUT STATUS MIGRAINOSUS, INTRACTABLE: Primary | ICD-10-CM

## 2024-05-15 RX ORDER — KETOROLAC TROMETHAMINE 30 MG/ML
60 INJECTION, SOLUTION INTRAMUSCULAR; INTRAVENOUS ONCE
Status: COMPLETED | OUTPATIENT
Start: 2024-05-15 | End: 2024-05-15

## 2024-05-15 RX ADMIN — KETOROLAC TROMETHAMINE 60 MG: 30 INJECTION, SOLUTION INTRAMUSCULAR; INTRAVENOUS at 19:26

## 2024-05-15 NOTE — PATIENT INSTRUCTIONS
Thank you for visiting [facility] today.    -Ibuprofen/Tylenol as needed for headache  -Drink plenty of fluids to maintain hydration  -Rest  -Exercise most days of the week  -Eat meals at routine time    Please go immediately to the ER if you develop shortness of breath, chest pain and uncontrolled fever above 100.4F.  Go to the ER if:  You suddenly develop a severe headache, along with any of the following:  Stiff neck, nausea and vomiting, confusion, weakness in one part or one side of your body, double vision or loss of vision, shortness of breath, rash, unusual sleepiness, fever or chills, trouble speaking, pain in your eye or ear, trouble walking or balancing, feeling faint or passing out    Please follow up with your PCP within 2-5 days if your signs and symptoms have not resolved or if they have worsened.
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
## 2024-06-06 ASSESSMENT — ENCOUNTER SYMPTOMS
ABDOMINAL PAIN: 0
VOMITING: 1
NAUSEA: 0

## 2024-06-25 ENCOUNTER — OFFICE VISIT (OUTPATIENT)
Age: 39
End: 2024-06-25

## 2024-06-25 VITALS
HEART RATE: 99 BPM | TEMPERATURE: 98.8 F | RESPIRATION RATE: 18 BRPM | WEIGHT: 257 LBS | OXYGEN SATURATION: 97 % | SYSTOLIC BLOOD PRESSURE: 143 MMHG | BODY MASS INDEX: 35.84 KG/M2 | DIASTOLIC BLOOD PRESSURE: 79 MMHG

## 2024-06-25 DIAGNOSIS — J06.9 VIRAL URI WITH COUGH: Primary | ICD-10-CM

## 2024-06-25 DIAGNOSIS — J02.9 SORE THROAT: ICD-10-CM

## 2024-06-25 LAB
GROUP A STREP ANTIGEN, POC: NEGATIVE
VALID INTERNAL CONTROL, POC: YES

## 2024-06-25 ASSESSMENT — ENCOUNTER SYMPTOMS: COUGH: 1

## 2024-06-25 NOTE — PATIENT INSTRUCTIONS
Strep test is negative today  Your vital signs are all stable, your physical exam is benign, I have low suspicion for bacterial infection at this time  It sounds possible that your cough could be related to seasonal allergies, I would like for you to take a daily Zyrtec  Additionally you can take over-the-counter dextromethorphan  With your being pregnant, I hesitate to start you on any new medications, and I do recommend you follow-up with your OB/GYN for further discussion of this

## 2024-06-25 NOTE — PROGRESS NOTES
Deanna Melchor (:  1985) is a 38 y.o. female,Established patient, here for evaluation of the following chief complaint(s):  Cough (C/o persistent cough. Sx 2 days.)      Assessment & Plan :  Visit Diagnoses and Associated Orders       Viral URI with cough    -  Primary         Sore throat        AMB POC RAPID STREP A [52942 CPT(R)]                 Strep test is negative today  Your vital signs are all stable, your physical exam is benign, I have low suspicion for bacterial infection at this time  It sounds possible that your cough could be related to seasonal allergies, I would like for you to take a daily Zyrtec  Additionally you can take over-the-counter dextromethorphan/mucinex  With your being pregnant, I hesitate to start you on any new medications, and I do recommend you follow-up with your OB/GYN for further discussion of this       Subjective :    Cough         38 y.o. female presents with symptoms of cough for the past 2 days.  She reports that she initially had some vomiting secondary to her being pregnant.  After this episode of vomiting she developed a sore, scratchy throat as well as this persistent dry nagging cough.  She denies any fevers, chills, body aches or fatigue.  Denies ear pain, nasal congestion.  She does occasionally cough up some clear-colored mucus, denies any shortness of breath or wheezing.  She has used her child's nebulizer treatment without significant relief.  Of note she was seen about a month and a half ago and diagnosed with strep.  She took several days of her antibiotics but then stopped taking it.         Vitals:    24 1911   BP: (!) 143/79   Site: Right Upper Arm   Position: Sitting   Cuff Size: Medium Adult   Pulse: 99   Resp: 18   Temp: 98.8 °F (37.1 °C)   TempSrc: Oral   SpO2: 97%   Weight: 116.6 kg (257 lb)       Results for orders placed or performed in visit on 24   AMB POC RAPID STREP A   Result Value Ref Range    Valid Internal Control, POC yes   Blood culture positive for microorganism Blood culture positive for microorganism Blood culture positive for microorganism Blood culture positive for microorganism Blood culture positive for microorganism

## 2024-08-25 ENCOUNTER — HOSPITAL ENCOUNTER (EMERGENCY)
Facility: HOSPITAL | Age: 39
Discharge: HOME | End: 2024-08-25
Payer: MEDICAID

## 2024-08-25 VITALS
TEMPERATURE: 98.7 F | RESPIRATION RATE: 15 BRPM | SYSTOLIC BLOOD PRESSURE: 131 MMHG | DIASTOLIC BLOOD PRESSURE: 70 MMHG | HEART RATE: 108 BPM

## 2024-08-25 DIAGNOSIS — E86.0 DEHYDRATION: Primary | ICD-10-CM

## 2024-08-25 LAB
APPEARANCE UR: CLEAR
BACTERIA URNS QL MICRO: ABNORMAL /HPF
BILIRUB UR QL: NEGATIVE
COLOR UR: ABNORMAL
EPITH CASTS URNS QL MICRO: ABNORMAL /LPF
GLUCOSE UR STRIP.AUTO-MCNC: NEGATIVE MG/DL
HGB UR QL STRIP: NEGATIVE
HYALINE CASTS URNS QL MICRO: ABNORMAL /LPF (ref 0–5)
KETONES UR QL STRIP.AUTO: ABNORMAL MG/DL
LEUKOCYTE ESTERASE UR QL STRIP.AUTO: ABNORMAL
NITRITE UR QL STRIP.AUTO: NEGATIVE
PH UR STRIP: 7 (ref 5–8)
PROT UR STRIP-MCNC: 100 MG/DL
RBC #/AREA URNS HPF: ABNORMAL /HPF (ref 0–5)
SP GR UR REFRACTOMETRY: 1.02 (ref 1–1.03)
URINE CULTURE IF INDICATED: ABNORMAL
UROBILINOGEN UR QL STRIP.AUTO: 1 EU/DL (ref 0.2–1)
WBC URNS QL MICRO: ABNORMAL /HPF (ref 0–4)

## 2024-08-25 PROCEDURE — 99283 EMERGENCY DEPT VISIT LOW MDM: CPT

## 2024-08-25 PROCEDURE — 81001 URINALYSIS AUTO W/SCOPE: CPT

## 2024-08-25 PROCEDURE — 4500000002 HC ER NO CHARGE

## 2024-08-25 ASSESSMENT — PAIN - FUNCTIONAL ASSESSMENT: PAIN_FUNCTIONAL_ASSESSMENT: NONE - DENIES PAIN

## 2024-08-25 NOTE — PROGRESS NOTES
1426 Pt up to restroom to provide urine sample for U/A.   1430 YUMIKO Treadwell CNM at bedside, SVE, nitrazine negative, cervix closed. Plan to send UA.   1555 UA results reviewed with YUMIKO Treadwell CNM and pt- plan for discharge home and follow up with [facility] this week for normal prenatal care with suggestion to obtain another U/A. Pt agreeable to plan, discharged home ambulatory.
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
## 2024-08-25 NOTE — PROGRESS NOTES
1353: pt here with c/o lower abdominal pressure that is constant and feels like a cramp. Denies any vaginal bleeding. Reports that her pants are wet but she isn't sure if it's because she was just walking.   1359: YUMIKO Treadwell CNM at bedside, plan of care discussed  1405: care assumed by DANDY Del Angel RN

## 2024-11-04 DIAGNOSIS — Z02.89 ENCOUNTER FOR COMPLETION OF FORM WITH PATIENT: ICD-10-CM

## 2024-11-04 DIAGNOSIS — J45.41 MODERATE PERSISTENT ASTHMA WITH (ACUTE) EXACERBATION: ICD-10-CM

## 2024-11-04 NOTE — TELEPHONE ENCOUNTER
Pt p# [phone], pt requesting to be seen for Fluid retention and migraines since giving birth 1 week ago. Her OBGN sent her to her Primary for those issues.

## 2024-11-06 RX ORDER — IPRATROPIUM BROMIDE AND ALBUTEROL SULFATE 2.5; .5 MG/3ML; MG/3ML
3 SOLUTION RESPIRATORY (INHALATION) 4 TIMES DAILY PRN
Qty: 360 ML | Refills: 5 | Status: SHIPPED | OUTPATIENT
Start: 2024-11-06

## 2024-11-06 RX ORDER — ALBUTEROL SULFATE 90 UG/1
1 INHALANT RESPIRATORY (INHALATION) EVERY 4 HOURS PRN
Qty: 18 G | Refills: 4 | Status: SHIPPED | OUTPATIENT
Start: 2024-11-06

## 2024-11-08 ENCOUNTER — OFFICE VISIT (OUTPATIENT)
Age: 39
End: 2024-11-08
Payer: MEDICAID

## 2024-11-08 VITALS
WEIGHT: 269 LBS | RESPIRATION RATE: 17 BRPM | SYSTOLIC BLOOD PRESSURE: 139 MMHG | HEART RATE: 68 BPM | BODY MASS INDEX: 37.52 KG/M2 | TEMPERATURE: 97.4 F | DIASTOLIC BLOOD PRESSURE: 89 MMHG | OXYGEN SATURATION: 97 %

## 2024-11-08 DIAGNOSIS — R03.0 ELEVATED BLOOD-PRESSURE READING WITHOUT DIAGNOSIS OF HYPERTENSION: ICD-10-CM

## 2024-11-08 DIAGNOSIS — F32.89 OTHER DEPRESSION: ICD-10-CM

## 2024-11-08 DIAGNOSIS — K02.9 DENTAL CARIES: Primary | ICD-10-CM

## 2024-11-08 PROCEDURE — 99214 OFFICE O/P EST MOD 30 MIN: CPT | Performed by: FAMILY MEDICINE

## 2024-11-08 RX ORDER — ACETAMINOPHEN 325 MG/1
650 TABLET ORAL EVERY 6 HOURS PRN
COMMUNITY
Start: 2024-10-30 | End: 2024-11-09

## 2024-11-08 RX ORDER — AMOXICILLIN 500 MG/1
500 CAPSULE ORAL 3 TIMES DAILY
Qty: 21 CAPSULE | Refills: 0 | Status: SHIPPED | OUTPATIENT
Start: 2024-11-08 | End: 2024-11-15

## 2024-11-08 SDOH — ECONOMIC STABILITY: FOOD INSECURITY: WITHIN THE PAST 12 MONTHS, YOU WORRIED THAT YOUR FOOD WOULD RUN OUT BEFORE YOU GOT MONEY TO BUY MORE.: NEVER TRUE

## 2024-11-08 SDOH — ECONOMIC STABILITY: INCOME INSECURITY: HOW HARD IS IT FOR YOU TO PAY FOR THE VERY BASICS LIKE FOOD, HOUSING, MEDICAL CARE, AND HEATING?: NOT HARD AT ALL

## 2024-11-08 SDOH — ECONOMIC STABILITY: FOOD INSECURITY: WITHIN THE PAST 12 MONTHS, THE FOOD YOU BOUGHT JUST DIDN'T LAST AND YOU DIDN'T HAVE MONEY TO GET MORE.: NEVER TRUE

## 2024-11-08 ASSESSMENT — PATIENT HEALTH QUESTIONNAIRE - PHQ9
9. THOUGHTS THAT YOU WOULD BE BETTER OFF DEAD, OR OF HURTING YOURSELF: NOT AT ALL
6. FEELING BAD ABOUT YOURSELF - OR THAT YOU ARE A FAILURE OR HAVE LET YOURSELF OR YOUR FAMILY DOWN: NOT AT ALL
SUM OF ALL RESPONSES TO PHQ QUESTIONS 1-9: 0
SUM OF ALL RESPONSES TO PHQ QUESTIONS 1-9: 0
7. TROUBLE CONCENTRATING ON THINGS, SUCH AS READING THE NEWSPAPER OR WATCHING TELEVISION: NOT AT ALL
3. TROUBLE FALLING OR STAYING ASLEEP: NOT AT ALL
2. FEELING DOWN, DEPRESSED OR HOPELESS: NOT AT ALL
1. LITTLE INTEREST OR PLEASURE IN DOING THINGS: NOT AT ALL
4. FEELING TIRED OR HAVING LITTLE ENERGY: NOT AT ALL
5. POOR APPETITE OR OVEREATING: NOT AT ALL
SUM OF ALL RESPONSES TO PHQ QUESTIONS 1-9: 0
SUM OF ALL RESPONSES TO PHQ QUESTIONS 1-9: 0
8. MOVING OR SPEAKING SO SLOWLY THAT OTHER PEOPLE COULD HAVE NOTICED. OR THE OPPOSITE, BEING SO FIGETY OR RESTLESS THAT YOU HAVE BEEN MOVING AROUND A LOT MORE THAN USUAL: NOT AT ALL
10. IF YOU CHECKED OFF ANY PROBLEMS, HOW DIFFICULT HAVE THESE PROBLEMS MADE IT FOR YOU TO DO YOUR WORK, TAKE CARE OF THINGS AT HOME, OR GET ALONG WITH OTHER PEOPLE: NOT DIFFICULT AT ALL
SUM OF ALL RESPONSES TO PHQ9 QUESTIONS 1 & 2: 0

## 2024-11-08 NOTE — PROGRESS NOTES
Chief Complaint   Patient presents with    Headache    Swelling     Both feet swelling     Dental Pain     Broke tooth delivering baby 11 days ago       \"Have you been to the ER, urgent care clinic since your last visit?  Hospitalized since your last visit?\"    NO    “Have you seen or consulted any other health care providers outside of [facility] since your last visit?”    NO     “Have you had a pap smear?”    YES    Date of last Cervical Cancer screen (HPV or PAP): 10/8/2013             Click Here for Release of Records Request     No results found for this visit on 24.   Vitals:    24 1232 24 1237   BP: (!) 156/86 139/89   Site: Left Upper Arm Right Upper Arm   Position: Sitting Sitting   Cuff Size: Large Adult Large Adult   Pulse: 68    Resp: 17    Temp: 97.4 °F (36.3 °C)    TempSrc: Infrared    SpO2: 97%    Weight: 122 kg (269 lb)           The patient, Deanna Melchor, identity was verified by name and .

## 2024-11-08 NOTE — ASSESSMENT & PLAN NOTE
Able no suicidal homicidal ideation off Wellbutrin continue to observe increase physical activity return to clinic in 6 weeks

## 2024-11-08 NOTE — ASSESSMENT & PLAN NOTE
Stable unfortunately painful patient will be treated with current medication was told to follow-up with dentist for further care    Orders:    amoxicillin (AMOXIL) 500 MG capsule; Take 1 capsule by mouth 3 times daily for 7 days

## 2024-11-08 NOTE — PROGRESS NOTES
Deanna Melchor (:  1985) is a 39 y.o. female,Established patient, here for evaluation of the following chief complaint(s):  Headache, Swelling (Both feet swelling ), and Dental Pain (Broke tooth delivering baby 11 days ago)  Patient present postpartum date 11 no spotting insert  incision clean dry intact stating that breast-feeding is not going well and she has started formula feeding she worry about blood pressure elevation.  Blood pressure was checked nice and normal having a low-salt diet patient has no leg swelling does not do home blood pressure monitoring denies any fever chills not feeling anxious depressed love her son have great family support have no illusion hallucination not want to harm anybody , present with the c/o left sided jaw pain, requesting treatment patient is stating that had one of tooth with big abscess started last week has not seen the dentist awaiting the appointment the pain most is at left jaw is swelled up painful unable to chew and is not better, the pain is 7/10 throbbing pain, and is not getting better, sleeping well appetite back to normal have no abdominal pain    Constitutional: no fever,  nad     HENT: no ear pain or nosebleeds. No blurred vision  Respiratory: no shortness of breath, wheezing cough   Cardiovascular: Has no chest pain, ,and racing heart .   Gastrointestinal: No constipation, diarrhea, nausea and vomiting.   Genitourinary: No frequency.   Musculoskeletal: Negative for joint pain.   Skin: no itching, no rash.   Neurological: Negative for dizziness, no tremors  Psychiatric/Behavioral: no for depression  no nervous/anxious, nl stress levels, and overall emotional well-being .          Constitutional: Well developed, well nourished.  non-toxic in appearance, not diaphoretic.   HEENT: PERRL. EOMI. The left TM is unremarkable. The right TM is unremarkable. No nasal  erythema noted.  THROAT: Posterior pharynx has no erythema, no exudates.    Neck:  no

## 2025-02-06 ENCOUNTER — OFFICE VISIT (OUTPATIENT)
Age: 40
End: 2025-02-06
Payer: MEDICAID

## 2025-02-06 VITALS
RESPIRATION RATE: 16 BRPM | SYSTOLIC BLOOD PRESSURE: 134 MMHG | BODY MASS INDEX: 37.8 KG/M2 | TEMPERATURE: 97.9 F | WEIGHT: 271 LBS | DIASTOLIC BLOOD PRESSURE: 87 MMHG | HEART RATE: 74 BPM | OXYGEN SATURATION: 96 %

## 2025-02-06 DIAGNOSIS — F31.81 BIPOLAR 2 DISORDER, MAJOR DEPRESSIVE EPISODE (HCC): ICD-10-CM

## 2025-02-06 DIAGNOSIS — H52.203 ASTIGMATISM OF BOTH EYES, UNSPECIFIED TYPE: ICD-10-CM

## 2025-02-06 DIAGNOSIS — F33.9 RECURRENT DEPRESSIVE DISORDER (HCC): Primary | ICD-10-CM

## 2025-02-06 PROCEDURE — 99214 OFFICE O/P EST MOD 30 MIN: CPT | Performed by: FAMILY MEDICINE

## 2025-02-06 SDOH — ECONOMIC STABILITY: FOOD INSECURITY: WITHIN THE PAST 12 MONTHS, THE FOOD YOU BOUGHT JUST DIDN'T LAST AND YOU DIDN'T HAVE MONEY TO GET MORE.: NEVER TRUE

## 2025-02-06 SDOH — ECONOMIC STABILITY: FOOD INSECURITY: WITHIN THE PAST 12 MONTHS, YOU WORRIED THAT YOUR FOOD WOULD RUN OUT BEFORE YOU GOT MONEY TO BUY MORE.: NEVER TRUE

## 2025-02-06 ASSESSMENT — PATIENT HEALTH QUESTIONNAIRE - PHQ9
8. MOVING OR SPEAKING SO SLOWLY THAT OTHER PEOPLE COULD HAVE NOTICED. OR THE OPPOSITE, BEING SO FIGETY OR RESTLESS THAT YOU HAVE BEEN MOVING AROUND A LOT MORE THAN USUAL: NOT AT ALL
SUM OF ALL RESPONSES TO PHQ QUESTIONS 1-9: 2
SUM OF ALL RESPONSES TO PHQ9 QUESTIONS 1 & 2: 1
9. THOUGHTS THAT YOU WOULD BE BETTER OFF DEAD, OR OF HURTING YOURSELF: NOT AT ALL
4. FEELING TIRED OR HAVING LITTLE ENERGY: NOT AT ALL
2. FEELING DOWN, DEPRESSED OR HOPELESS: SEVERAL DAYS
6. FEELING BAD ABOUT YOURSELF - OR THAT YOU ARE A FAILURE OR HAVE LET YOURSELF OR YOUR FAMILY DOWN: NOT AT ALL
SUM OF ALL RESPONSES TO PHQ QUESTIONS 1-9: 2
1. LITTLE INTEREST OR PLEASURE IN DOING THINGS: NOT AT ALL
10. IF YOU CHECKED OFF ANY PROBLEMS, HOW DIFFICULT HAVE THESE PROBLEMS MADE IT FOR YOU TO DO YOUR WORK, TAKE CARE OF THINGS AT HOME, OR GET ALONG WITH OTHER PEOPLE: NOT DIFFICULT AT ALL
7. TROUBLE CONCENTRATING ON THINGS, SUCH AS READING THE NEWSPAPER OR WATCHING TELEVISION: NOT AT ALL
5. POOR APPETITE OR OVEREATING: NOT AT ALL
3. TROUBLE FALLING OR STAYING ASLEEP: SEVERAL DAYS

## 2025-02-06 NOTE — PROGRESS NOTES
Chief Complaint   Patient presents with    Depression    Anxiety    Sleep Problem       \"Have you been to the ER, urgent care clinic since your last visit?  Hospitalized since your last visit?\"    NO    “Have you seen or consulted any other health care providers outside of [facility] since your last visit?”    NO     “Have you had a pap smear?”    NO    Date of last Cervical Cancer screen (HPV or PAP): 10/8/2013             Click Here for Release of Records Request     No results found for this visit on 25.   Vitals:    25 1607   BP: 134/87   Pulse: 74   Resp: 16   Temp: 97.9 °F (36.6 °C)   TempSrc: Infrared   SpO2: 96%   Weight: 122.9 kg (271 lb)      Health Maintenance Due   Topic Date Due    Cervical cancer screen  10/08/2018        The patient, Deanna Melchor, identity was verified by name and .

## 2025-03-11 ENCOUNTER — TELEPHONE (OUTPATIENT)
Age: 40
End: 2025-03-11

## 2025-04-02 ENCOUNTER — TELEMEDICINE (OUTPATIENT)
Age: 40
End: 2025-04-02
Payer: MEDICAID

## 2025-04-02 DIAGNOSIS — F31.81 BIPOLAR 2 DISORDER, MAJOR DEPRESSIVE EPISODE (HCC): ICD-10-CM

## 2025-04-02 DIAGNOSIS — Z02.89 ENCOUNTER FOR COMPLETION OF FORM WITH PATIENT: ICD-10-CM

## 2025-04-02 DIAGNOSIS — F43.21 COMPLICATED GRIEF: ICD-10-CM

## 2025-04-02 DIAGNOSIS — F33.9 RECURRENT DEPRESSIVE DISORDER: ICD-10-CM

## 2025-04-02 DIAGNOSIS — Z02.71 DISABILITY EXAMINATION: ICD-10-CM

## 2025-04-02 DIAGNOSIS — F33.1 MODERATE EPISODE OF RECURRENT MAJOR DEPRESSIVE DISORDER (HCC): Primary | ICD-10-CM

## 2025-04-02 PROCEDURE — 99214 OFFICE O/P EST MOD 30 MIN: CPT | Performed by: FAMILY MEDICINE

## 2025-04-02 NOTE — PROGRESS NOTES
Deanna Melchor, was evaluated through a synchronous (real-time) audio-video encounter. The patient (or guardian if applicable) is aware that this is a billable service, which includes applicable co-pays. This Virtual Visit was conducted with patient's (and/or legal guardian's) consent. Patient identification was verified, and a caregiver was present when appropriate.   The patient was located at Home: [address] [place] [id number]  Provider was located at Home [place]Appt [place] State[place]  Confirm you are appropriately licensed, registered, or certified to deliver care in the state where the patient is located as indicated above. If you are not or unsure, please re-schedule the visit: Yes, I confirm.     Deanna Melchor (:  1985) is a Established patient, presenting virtually for evaluation of the following:  Grieving, just have a baby, working daily, work at [employer], stating that her job allwoing her to not work 40hrs per month, no s no h ideation, states that she needs a  brake,  Face to face stability examination 4 patient's medical conditions, the pt is unable to her work secondary to disabling flareup of her anxiety, stating that when that happens she does get HA, feeling depressed, multiple joint pain, neck pain, hands pain.  fatigue, LAURO,   the patient can not be functional if she gets anxiety attacks at this time she is able to function at work without any problem she states that she has a lot of stress in her family at this time and this may happen couple times per month patient has been seen by the counselor and she attends grieving sessions with her family member   , today patient states that the medication that she has been prescribed is not able to get rid of her anxiety attack and they have not been helping,    patient also states that is not been able to sleep and not seen improvement with concentration at this time overall patient feels safe at home  and at surrounding,

## 2025-04-02 NOTE — PROGRESS NOTES
Chief Complaint   Patient presents with    Anxiety     Follow up        \"Have you been to the ER, urgent care clinic since your last visit?  Hospitalized since your last visit?\"    NO    “Have you seen or consulted any other health care providers outside of [facility] since your last visit?”    NO     “Have you had a pap smear?”    YES    Date of last Cervical Cancer screen (HPV or PAP): 10/8/2013       The patient, Deanna Melchor, identity was verified by name and .

## 2025-04-02 NOTE — ASSESSMENT & PLAN NOTE
Chronic, not at goal (unstable), continue current treatment plan and medication adherence emphasized    Orders:    [facility]    External Referral To Counseling Services    cariprazine hcl (VRAYLAR) 1.5 MG capsule; Take 1 capsule by mouth daily

## 2025-04-17 ENCOUNTER — TELEPHONE (OUTPATIENT)
Age: 40
End: 2025-04-17

## 2025-04-21 ENCOUNTER — APPOINTMENT (OUTPATIENT)
Facility: HOSPITAL | Age: 40
End: 2025-04-21
Payer: MEDICAID

## 2025-04-21 ENCOUNTER — HOSPITAL ENCOUNTER (EMERGENCY)
Facility: HOSPITAL | Age: 40
Discharge: HOME | End: 2025-04-21
Attending: EMERGENCY MEDICINE
Payer: MEDICAID

## 2025-04-21 VITALS
OXYGEN SATURATION: 100 % | HEIGHT: 71 IN | RESPIRATION RATE: 18 BRPM | WEIGHT: 266.1 LBS | HEART RATE: 87 BPM | BODY MASS INDEX: 37.25 KG/M2 | SYSTOLIC BLOOD PRESSURE: 135 MMHG | TEMPERATURE: 98.1 F | DIASTOLIC BLOOD PRESSURE: 100 MMHG

## 2025-04-21 DIAGNOSIS — N83.202 LEFT OVARIAN CYST: ICD-10-CM

## 2025-04-21 DIAGNOSIS — N93.9 VAGINAL BLEEDING: Primary | ICD-10-CM

## 2025-04-21 LAB
ALBUMIN SERPL-MCNC: 3.7 G/DL (ref 3.5–5)
ALBUMIN/GLOB SERPL: 1 (ref 1.1–2.2)
ALP SERPL-CCNC: 126 U/L (ref 45–117)
ALT SERPL-CCNC: 21 U/L (ref 12–78)
ANION GAP SERPL CALC-SCNC: 4 MMOL/L (ref 2–12)
APPEARANCE UR: CLEAR
AST SERPL-CCNC: 14 U/L (ref 15–37)
BACTERIA URNS QL MICRO: NEGATIVE /HPF
BASOPHILS # BLD: 0.05 K/UL (ref 0–0.1)
BASOPHILS NFR BLD: 0.5 % (ref 0–1)
BILIRUB SERPL-MCNC: 0.5 MG/DL (ref 0.2–1)
BILIRUB UR QL: NEGATIVE
BUN SERPL-MCNC: 13 MG/DL (ref 6–20)
BUN/CREAT SERPL: 15 (ref 12–20)
CALCIUM SERPL-MCNC: 8.8 MG/DL (ref 8.5–10.1)
CHLORIDE SERPL-SCNC: 105 MMOL/L (ref 97–108)
CO2 SERPL-SCNC: 26 MMOL/L (ref 21–32)
COLOR UR: ABNORMAL
COMMENT:: NORMAL
CREAT SERPL-MCNC: 0.84 MG/DL (ref 0.55–1.02)
DIFFERENTIAL METHOD BLD: ABNORMAL
EOSINOPHIL # BLD: 0.11 K/UL (ref 0–0.4)
EOSINOPHIL NFR BLD: 1.1 % (ref 0–7)
EPITH CASTS URNS QL MICRO: ABNORMAL /LPF
ERYTHROCYTE [DISTWIDTH] IN BLOOD BY AUTOMATED COUNT: 14.8 % (ref 11.5–14.5)
GLOBULIN SER CALC-MCNC: 3.8 G/DL (ref 2–4)
GLUCOSE SERPL-MCNC: 92 MG/DL (ref 65–100)
GLUCOSE UR STRIP.AUTO-MCNC: NEGATIVE MG/DL
HCG SERPL-ACNC: <1 MIU/ML (ref 0–6)
HCT VFR BLD AUTO: 36.4 % (ref 35–47)
HGB BLD-MCNC: 11.6 G/DL (ref 11.5–16)
HGB UR QL STRIP: ABNORMAL
HYALINE CASTS URNS QL MICRO: ABNORMAL /LPF (ref 0–5)
IMM GRANULOCYTES # BLD AUTO: 0.03 K/UL (ref 0–0.04)
IMM GRANULOCYTES NFR BLD AUTO: 0.3 % (ref 0–0.5)
KETONES UR QL STRIP.AUTO: NEGATIVE MG/DL
LEUKOCYTE ESTERASE UR QL STRIP.AUTO: ABNORMAL
LYMPHOCYTES # BLD: 3.4 K/UL (ref 0.8–3.5)
LYMPHOCYTES NFR BLD: 34 % (ref 12–49)
MCH RBC QN AUTO: 26.9 PG (ref 26–34)
MCHC RBC AUTO-ENTMCNC: 31.9 G/DL (ref 30–36.5)
MCV RBC AUTO: 84.5 FL (ref 80–99)
MONOCYTES # BLD: 0.55 K/UL (ref 0–1)
MONOCYTES NFR BLD: 5.5 % (ref 5–13)
NEUTS SEG # BLD: 5.85 K/UL (ref 1.8–8)
NEUTS SEG NFR BLD: 58.6 % (ref 32–75)
NITRITE UR QL STRIP.AUTO: NEGATIVE
NRBC # BLD: 0 K/UL (ref 0–0.01)
NRBC BLD-RTO: 0 PER 100 WBC
PH UR STRIP: 7 (ref 5–8)
PLATELET # BLD AUTO: 414 K/UL (ref 150–400)
PMV BLD AUTO: 9.1 FL (ref 8.9–12.9)
POTASSIUM SERPL-SCNC: 3.4 MMOL/L (ref 3.5–5.1)
PROT SERPL-MCNC: 7.5 G/DL (ref 6.4–8.2)
PROT UR STRIP-MCNC: 30 MG/DL
RBC # BLD AUTO: 4.31 M/UL (ref 3.8–5.2)
RBC #/AREA URNS HPF: >100 /HPF (ref 0–5)
SAC DIAMETER: 0 CM
SODIUM SERPL-SCNC: 135 MMOL/L (ref 136–145)
SP GR UR REFRACTOMETRY: 1.01 (ref 1–1.03)
SPECIMEN HOLD: NORMAL
URINE CULTURE IF INDICATED: ABNORMAL
UROBILINOGEN UR QL STRIP.AUTO: 1 EU/DL (ref 0.2–1)
WBC # BLD AUTO: 10 K/UL (ref 3.6–11)
WBC URNS QL MICRO: ABNORMAL /HPF (ref 0–4)

## 2025-04-21 PROCEDURE — 99284 EMERGENCY DEPT VISIT MOD MDM: CPT

## 2025-04-21 PROCEDURE — 76801 OB US < 14 WKS SINGLE FETUS: CPT

## 2025-04-21 PROCEDURE — 76817 TRANSVAGINAL US OBSTETRIC: CPT

## 2025-04-21 PROCEDURE — 85025 COMPLETE CBC W/AUTO DIFF WBC: CPT

## 2025-04-21 PROCEDURE — 2580000003 HC RX 258: Performed by: NURSE PRACTITIONER

## 2025-04-21 PROCEDURE — 84702 CHORIONIC GONADOTROPIN TEST: CPT

## 2025-04-21 PROCEDURE — 80053 COMPREHEN METABOLIC PANEL: CPT

## 2025-04-21 PROCEDURE — 81001 URINALYSIS AUTO W/SCOPE: CPT

## 2025-04-21 RX ORDER — 0.9 % SODIUM CHLORIDE 0.9 %
1000 INTRAVENOUS SOLUTION INTRAVENOUS ONCE
Status: COMPLETED | OUTPATIENT
Start: 2025-04-21 | End: 2025-04-21

## 2025-04-21 RX ADMIN — SODIUM CHLORIDE 1000 ML: 0.9 INJECTION, SOLUTION INTRAVENOUS at 18:54

## 2025-04-21 ASSESSMENT — PAIN SCALES - GENERAL
PAINLEVEL_OUTOF10: 4
PAINLEVEL_OUTOF10: 2

## 2025-04-21 ASSESSMENT — PAIN DESCRIPTION - FREQUENCY
FREQUENCY: CONTINUOUS
FREQUENCY: CONTINUOUS

## 2025-04-21 ASSESSMENT — PAIN - FUNCTIONAL ASSESSMENT
PAIN_FUNCTIONAL_ASSESSMENT: 0-10
PAIN_FUNCTIONAL_ASSESSMENT: 0-10
PAIN_FUNCTIONAL_ASSESSMENT: ACTIVITIES ARE NOT PREVENTED
PAIN_FUNCTIONAL_ASSESSMENT: ACTIVITIES ARE NOT PREVENTED

## 2025-04-21 ASSESSMENT — PAIN DESCRIPTION - DESCRIPTORS
DESCRIPTORS: ACHING;DISCOMFORT
DESCRIPTORS: ACHING

## 2025-04-21 ASSESSMENT — PAIN DESCRIPTION - PAIN TYPE
TYPE: ACUTE PAIN
TYPE: ACUTE PAIN

## 2025-04-21 ASSESSMENT — PAIN DESCRIPTION - LOCATION
LOCATION: BACK
LOCATION: BACK

## 2025-04-21 ASSESSMENT — PAIN DESCRIPTION - ORIENTATION
ORIENTATION: RIGHT;LEFT;LOWER
ORIENTATION: RIGHT;LEFT

## 2025-04-21 ASSESSMENT — PAIN DESCRIPTION - ONSET: ONSET: ON-GOING

## 2025-04-21 NOTE — DISCHARGE INSTRUCTIONS
There is no evidence that you are or were pregnant today. Your pregnancy test was negative via blood work today. Your ultrasound shows that you are having a period, you also have an ovarian cyst (which can be very painful). Please call [facility] tomorrow to set up follow up.     You can use home treatment for some problems related to abnormal uterine bleeding.  A non-steroidal anti-inflammatory drug (NSAID), such as over-the-counter ibuprofen (Advil, Motrin), can help reduce bleeding and menstrual pain. It works best when you start taking it 1 to 2 days before you expect bleeding or pain to start. If you don't know when your period will start next, take your first dose of an NSAID as soon as bleeding or premenstrual pain starts. Be safe with medicines, and follow your doctor's instructions.  Irregular menstrual bleeding can lead to low levels of iron in the blood. This condition is known as iron deficiency anemia. You can help prevent anemia by increasing the amount of iron in your diet.

## 2025-04-21 NOTE — ED PROVIDER NOTES
the absence of a cardiologist.        RADIOLOGY:   Non-plain film images such as CT, Ultrasound and MRI are read by the radiologist. Plain radiographic images are visualized and preliminarily interpreted by the emergency physician with the below findings:        Interpretation per the Radiologist below, if available at the time of this note:    US OB TRANSVAGINAL   Final Result   Normal pelvic ultrasound.  Correlation with transvaginal ultrasound will be   performed.      TRANSVAGINAL ULTRASOUND:   Realtime sonographic imaging of the pelvis was performed transvaginally. The   uterus  is normal in appearance.    The endometrial stripe measures 13 mm.    Ovaries are not visualized secondary to bowel gas. The ovaries are normal in   appearance. No free fluid.       IMPRESSION:    No intrauterine gestation is identified.      There is a small left ovarian cyst.         Electronically signed by Tauntr      US OB LESS THAN 14 WEEKS SINGLE OR FIRST GESTATION   Final Result   Normal pelvic ultrasound.  Correlation with transvaginal ultrasound will be   performed.      TRANSVAGINAL ULTRASOUND:   Realtime sonographic imaging of the pelvis was performed transvaginally. The   uterus  is normal in appearance.    The endometrial stripe measures 13 mm.    Ovaries are not visualized secondary to bowel gas. The ovaries are normal in   appearance. No free fluid.       IMPRESSION:    No intrauterine gestation is identified.      There is a small left ovarian cyst.         Electronically signed by Tauntr           LABS:  Labs Reviewed   CBC WITH AUTO DIFFERENTIAL - Abnormal; Notable for the following components:       Result Value    RDW 14.8 (*)     Platelets 414 (*)     All other components within normal limits   COMPREHENSIVE METABOLIC PANEL - Abnormal; Notable for the following components:    Sodium 135 (*)     Potassium 3.4 (*)     AST 14 (*)     Alk Phosphatase 126 (*)     Albumin/Globulin Ratio 1.0 (*)     All other

## 2025-04-21 NOTE — ED TRIAGE NOTES
Patient arrives to ED reports heavy vaginal bleeding starting yesterday.  Reports she is pregnant, LMP 3/6/25 with + home pregnancy tests

## 2025-04-22 ASSESSMENT — ENCOUNTER SYMPTOMS: BACK PAIN: 1

## 2025-04-22 NOTE — ED NOTES
Patient discharged by MASOUD Vargas. Discharge information / home RX / and reasons to return to the ED were reviewed by the ED provider. The patient verbalized understanding. She left ambulatory in stable condition, no acute distress noted.

## 2025-06-02 DIAGNOSIS — Z02.71 DISABILITY EXAMINATION: ICD-10-CM

## 2025-06-02 DIAGNOSIS — F43.21 COMPLICATED GRIEF: ICD-10-CM

## 2025-06-02 DIAGNOSIS — F31.81 BIPOLAR 2 DISORDER, MAJOR DEPRESSIVE EPISODE (HCC): ICD-10-CM

## 2025-06-02 DIAGNOSIS — Z02.89 ENCOUNTER FOR COMPLETION OF FORM WITH PATIENT: ICD-10-CM

## 2025-06-02 DIAGNOSIS — F33.9 RECURRENT DEPRESSIVE DISORDER: ICD-10-CM

## 2025-06-02 DIAGNOSIS — F33.1 MODERATE EPISODE OF RECURRENT MAJOR DEPRESSIVE DISORDER (HCC): ICD-10-CM

## 2025-06-02 NOTE — TELEPHONE ENCOUNTER
Patient is requesting a RX refill  cariprazine hcl (VRAYLAR) 1.5 MG capsule she can be reached @ [phone]

## 2025-06-25 ENCOUNTER — OFFICE VISIT (OUTPATIENT)
Age: 40
End: 2025-06-25
Payer: MEDICAID

## 2025-06-25 VITALS
TEMPERATURE: 97.6 F | DIASTOLIC BLOOD PRESSURE: 86 MMHG | SYSTOLIC BLOOD PRESSURE: 129 MMHG | RESPIRATION RATE: 18 BRPM | OXYGEN SATURATION: 97 % | BODY MASS INDEX: 34.45 KG/M2 | HEART RATE: 80 BPM | WEIGHT: 247 LBS

## 2025-06-25 DIAGNOSIS — Z02.71 DISABILITY EXAMINATION: Primary | ICD-10-CM

## 2025-06-25 DIAGNOSIS — F33.1 MODERATE EPISODE OF RECURRENT MAJOR DEPRESSIVE DISORDER (HCC): ICD-10-CM

## 2025-06-25 DIAGNOSIS — Z02.89 ENCOUNTER FOR COMPLETION OF FORM WITH PATIENT: ICD-10-CM

## 2025-06-25 DIAGNOSIS — F33.9 RECURRENT DEPRESSIVE DISORDER: ICD-10-CM

## 2025-06-25 PROCEDURE — 99214 OFFICE O/P EST MOD 30 MIN: CPT | Performed by: FAMILY MEDICINE

## 2025-06-25 ASSESSMENT — PATIENT HEALTH QUESTIONNAIRE - PHQ9
SUM OF ALL RESPONSES TO PHQ QUESTIONS 1-9: 13
8. MOVING OR SPEAKING SO SLOWLY THAT OTHER PEOPLE COULD HAVE NOTICED. OR THE OPPOSITE, BEING SO FIGETY OR RESTLESS THAT YOU HAVE BEEN MOVING AROUND A LOT MORE THAN USUAL: NEARLY EVERY DAY
7. TROUBLE CONCENTRATING ON THINGS, SUCH AS READING THE NEWSPAPER OR WATCHING TELEVISION: NOT AT ALL
2. FEELING DOWN, DEPRESSED OR HOPELESS: NEARLY EVERY DAY
1. LITTLE INTEREST OR PLEASURE IN DOING THINGS: NOT AT ALL
SUM OF ALL RESPONSES TO PHQ QUESTIONS 1-9: 13
9. THOUGHTS THAT YOU WOULD BE BETTER OFF DEAD, OR OF HURTING YOURSELF: NOT AT ALL
3. TROUBLE FALLING OR STAYING ASLEEP: NEARLY EVERY DAY
6. FEELING BAD ABOUT YOURSELF - OR THAT YOU ARE A FAILURE OR HAVE LET YOURSELF OR YOUR FAMILY DOWN: SEVERAL DAYS
5. POOR APPETITE OR OVEREATING: NEARLY EVERY DAY
SUM OF ALL RESPONSES TO PHQ QUESTIONS 1-9: 13
4. FEELING TIRED OR HAVING LITTLE ENERGY: NOT AT ALL
SUM OF ALL RESPONSES TO PHQ QUESTIONS 1-9: 13
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
## 2025-06-25 NOTE — ASSESSMENT & PLAN NOTE
Chronic, not at goal (unstable), continue current treatment plan and medication adherence emphasized         Secondary to the patient chronic medical conditions,   form was completed, w/ with multiple questions answered to the best knowledge will keep a copy in the chart for further correspondence patient was given signed completed patient was informed about the safety and  regulations regarding this condition patient was also advised to follow-up with HR for further guidelines patient acknowledged understanding and agreed with today's recommendations

## 2025-06-25 NOTE — PROGRESS NOTES
Chief Complaint   Patient presents with    documentation     [facility]    Depression     Follow up        \"Have you been to the ER, urgent care clinic since your last visit?  Hospitalized since your last visit?\"    NO    “Have you seen or consulted any other health care providers outside of [facility] since your last visit?”    NO     “Have you had a pap smear?”    YES    Date of last Cervical Cancer screen (HPV or PAP): 10/8/2013         Click Here for Release of Records Request     No results found for this visit on 25.   Vitals:    25 0823   BP: 129/86   Pulse: 80   Resp: 18   Temp: 97.6 °F (36.4 °C)   TempSrc: Temporal   SpO2: 97%   Weight: 112 kg (247 lb)          The patient, Deanna Melchor, identity was verified by name and .

## 2025-06-30 ENCOUNTER — TELEPHONE (OUTPATIENT)
Age: 40
End: 2025-06-30

## 2025-07-08 NOTE — PROGRESS NOTES
Chief Complaint   Patient presents with    Medication Refill     Follow up       \"Have you been to the ER, urgent care clinic since your last visit?  Hospitalized since your last visit?\"    NO    “Have you seen or consulted any other health care providers outside of [facility] since your last visit?”    NO          Health Maintenance Due   Topic Date Due    Hepatitis B vaccine (1 of 3 - 3-dose series) Never done    COVID-19 Vaccine (1) Never done    HIV screen  Never done    Hepatitis C screen  Never done    Cervical cancer screen  10/08/2018    Diabetes screen  Never done        The patient, Deanna Melchor, identity was verified by name and     1